# Patient Record
Sex: MALE | Race: WHITE | Employment: FULL TIME | URBAN - METROPOLITAN AREA
[De-identification: names, ages, dates, MRNs, and addresses within clinical notes are randomized per-mention and may not be internally consistent; named-entity substitution may affect disease eponyms.]

---

## 2019-08-21 ENCOUNTER — OFFICE VISIT (OUTPATIENT)
Dept: PRIMARY CARE CLINIC | Age: 64
End: 2019-08-21
Payer: COMMERCIAL

## 2019-08-21 VITALS
HEART RATE: 66 BPM | HEIGHT: 72 IN | WEIGHT: 183 LBS | SYSTOLIC BLOOD PRESSURE: 170 MMHG | BODY MASS INDEX: 24.79 KG/M2 | DIASTOLIC BLOOD PRESSURE: 94 MMHG

## 2019-08-21 DIAGNOSIS — E55.9 VITAMIN D DEFICIENCY: ICD-10-CM

## 2019-08-21 DIAGNOSIS — I10 SEVERE HYPERTENSION: Primary | ICD-10-CM

## 2019-08-21 DIAGNOSIS — E78.00 PURE HYPERCHOLESTEROLEMIA: ICD-10-CM

## 2019-08-21 LAB
BILIRUBIN, POC: NORMAL
BLOOD URINE, POC: NORMAL
CLARITY, POC: NORMAL
COLOR, POC: NORMAL
GLUCOSE URINE, POC: NORMAL
KETONES, POC: NORMAL
LEUKOCYTE EST, POC: NORMAL
NITRITE, POC: NORMAL
PH, POC: 7
PROTEIN, POC: NORMAL
SPECIFIC GRAVITY, POC: 1.02
UROBILINOGEN, POC: 0.2

## 2019-08-21 PROCEDURE — 99387 INIT PM E/M NEW PAT 65+ YRS: CPT | Performed by: FAMILY MEDICINE

## 2019-08-21 PROCEDURE — 81002 URINALYSIS NONAUTO W/O SCOPE: CPT | Performed by: FAMILY MEDICINE

## 2019-08-21 RX ORDER — M-VIT,TX,IRON,MINS/CALC/FOLIC 27MG-0.4MG
1 TABLET ORAL DAILY
COMMUNITY

## 2019-08-21 ASSESSMENT — PATIENT HEALTH QUESTIONNAIRE - PHQ9
2. FEELING DOWN, DEPRESSED OR HOPELESS: 0
SUM OF ALL RESPONSES TO PHQ9 QUESTIONS 1 & 2: 0
1. LITTLE INTEREST OR PLEASURE IN DOING THINGS: 0
SUM OF ALL RESPONSES TO PHQ QUESTIONS 1-9: 0
SUM OF ALL RESPONSES TO PHQ QUESTIONS 1-9: 0

## 2019-08-21 NOTE — PROGRESS NOTES
Subjective:      Patient ID: Nick Barragan is a 70 y.o. male. HPIHere to establish care. After running [employer] a doctor examined his skin and recommended a body survey, bright the shins. Review of Systems   HENT:        Occ blood clot ion the L nares   Eyes: Positive for visual disturbance. Genitourinary: Positive for difficulty urinating (very mild). All other systems reviewed and are negative. PMSHx reviewed and/or updated  BP 2 mos ago was ~ 130/79  Chol earlier this year was 124  Objective:   Physical Exam   Constitutional: He is oriented to person, place, and time. He appears well-developed and well-nourished. HENT:   Head: Normocephalic. Right Ear: Tympanic membrane, external ear and ear canal normal.   Left Ear: Tympanic membrane, external ear and ear canal normal.   Nose: Nose normal.   Mouth/Throat: Uvula is midline, oropharynx is clear and moist and mucous membranes are normal. No oropharyngeal exudate, posterior oropharyngeal edema or posterior oropharyngeal erythema. Eyes: Pupils are equal, round, and reactive to light. Conjunctivae and EOM are normal.   Cataract, os   Neck: Normal range of motion. Neck supple. Carotid bruit is not present. No thyromegaly present. Cardiovascular: Normal rate, regular rhythm and normal heart sounds. No murmur heard. Pulmonary/Chest: Effort normal and breath sounds normal.   Abdominal: Soft. Bowel sounds are normal. He exhibits no mass. There is no hepatosplenomegaly. There is no tenderness. No hernia. Hernia confirmed negative in the right inguinal area and confirmed negative in the left inguinal area. Genitourinary: Rectum normal, prostate normal, testes normal and penis normal.   Musculoskeletal: Normal range of motion. Joints stable and strong   Lymphadenopathy:     He has no cervical adenopathy. Neurological: He is alert and oriented to person, place, and time. He has normal strength and normal reflexes. No cranial nerve deficit.  He

## 2019-08-30 LAB
ANION GAP SERPL CALCULATED.3IONS-SCNC: 7 MMOL/L (ref 3–16)
BUN BLDV-MCNC: 14 MG/DL (ref 7–25)
CALCIUM SERPL-MCNC: 9.6 MG/DL (ref 8.6–10.3)
CHLORIDE BLD-SCNC: 105 MMOL/L (ref 98–110)
CHOLESTEROL, TOTAL: 149 MG/DL (ref 0–200)
CO2: 30 MMOL/L (ref 21–33)
CREAT SERPL-MCNC: 0.99 MG/DL (ref 0.6–1.3)
GFR, ESTIMATED: 80 SEE NOTE.
GFR, ESTIMATED: >90 SEE NOTE.
GLUCOSE BLD-MCNC: 94 MG/DL (ref 70–100)
HDLC SERPL-MCNC: 54 MG/DL (ref 60–92)
LDL CHOLESTEROL CALCULATED: 79 MG/DL
OSMOLALITY CALCULATION: 294 MOSM/KG (ref 278–305)
POTASSIUM SERPL-SCNC: 4.4 MMOL/L (ref 3.5–5.3)
SODIUM BLD-SCNC: 142 MMOL/L (ref 133–146)
TRIGL SERPL-MCNC: 79 MG/DL (ref 10–149)

## 2019-09-01 LAB — VITAMIN D 25-HYDROXY: 31.8 NG/ML (ref 30–100)

## 2019-10-31 ENCOUNTER — OFFICE VISIT (OUTPATIENT)
Dept: PRIMARY CARE CLINIC | Age: 64
End: 2019-10-31
Payer: MEDICARE

## 2019-10-31 VITALS
SYSTOLIC BLOOD PRESSURE: 150 MMHG | BODY MASS INDEX: 25.09 KG/M2 | DIASTOLIC BLOOD PRESSURE: 84 MMHG | HEART RATE: 65 BPM | WEIGHT: 185 LBS

## 2019-10-31 DIAGNOSIS — H26.9 CATARACT OF LEFT EYE, UNSPECIFIED CATARACT TYPE: ICD-10-CM

## 2019-10-31 DIAGNOSIS — I10 ESSENTIAL HYPERTENSION: ICD-10-CM

## 2019-10-31 DIAGNOSIS — Z01.818 PRE-OP EXAM: Primary | ICD-10-CM

## 2019-10-31 PROCEDURE — G8427 DOCREV CUR MEDS BY ELIG CLIN: HCPCS | Performed by: FAMILY MEDICINE

## 2019-10-31 PROCEDURE — G8484 FLU IMMUNIZE NO ADMIN: HCPCS | Performed by: FAMILY MEDICINE

## 2019-10-31 PROCEDURE — 99212 OFFICE O/P EST SF 10 MIN: CPT | Performed by: FAMILY MEDICINE

## 2019-10-31 PROCEDURE — G8417 CALC BMI ABV UP PARAM F/U: HCPCS | Performed by: FAMILY MEDICINE

## 2019-10-31 RX ORDER — VALSARTAN 160 MG/1
160 TABLET ORAL DAILY
Qty: 30 TABLET | Refills: 5 | Status: SHIPPED | OUTPATIENT
Start: 2019-10-31 | End: 2019-12-11

## 2019-10-31 ASSESSMENT — ENCOUNTER SYMPTOMS
GASTROINTESTINAL NEGATIVE: 1
RESPIRATORY NEGATIVE: 1

## 2019-12-11 ENCOUNTER — OFFICE VISIT (OUTPATIENT)
Dept: PRIMARY CARE CLINIC | Age: 64
End: 2019-12-11
Payer: MEDICARE

## 2019-12-11 VITALS — SYSTOLIC BLOOD PRESSURE: 150 MMHG | BODY MASS INDEX: 24.98 KG/M2 | DIASTOLIC BLOOD PRESSURE: 80 MMHG | WEIGHT: 184.2 LBS

## 2019-12-11 DIAGNOSIS — I10 ESSENTIAL HYPERTENSION: Primary | ICD-10-CM

## 2019-12-11 PROCEDURE — 3017F COLORECTAL CA SCREEN DOC REV: CPT | Performed by: FAMILY MEDICINE

## 2019-12-11 PROCEDURE — 4040F PNEUMOC VAC/ADMIN/RCVD: CPT | Performed by: FAMILY MEDICINE

## 2019-12-11 PROCEDURE — G8427 DOCREV CUR MEDS BY ELIG CLIN: HCPCS | Performed by: FAMILY MEDICINE

## 2019-12-11 PROCEDURE — 99213 OFFICE O/P EST LOW 20 MIN: CPT | Performed by: FAMILY MEDICINE

## 2019-12-11 PROCEDURE — G8420 CALC BMI NORM PARAMETERS: HCPCS | Performed by: FAMILY MEDICINE

## 2019-12-11 PROCEDURE — G8484 FLU IMMUNIZE NO ADMIN: HCPCS | Performed by: FAMILY MEDICINE

## 2019-12-11 PROCEDURE — 1123F ACP DISCUSS/DSCN MKR DOCD: CPT | Performed by: FAMILY MEDICINE

## 2019-12-11 PROCEDURE — 1036F TOBACCO NON-USER: CPT | Performed by: FAMILY MEDICINE

## 2019-12-11 RX ORDER — VALSARTAN AND HYDROCHLOROTHIAZIDE 160; 12.5 MG/1; MG/1
1 TABLET, FILM COATED ORAL DAILY
Qty: 30 TABLET | Refills: 5 | Status: SHIPPED | OUTPATIENT
Start: 2019-12-11 | End: 2020-06-16

## 2020-06-18 ENCOUNTER — VIRTUAL VISIT (OUTPATIENT)
Dept: PRIMARY CARE CLINIC | Age: 65
End: 2020-06-18
Payer: MEDICARE

## 2020-06-18 PROCEDURE — 3017F COLORECTAL CA SCREEN DOC REV: CPT | Performed by: FAMILY MEDICINE

## 2020-06-18 PROCEDURE — 4040F PNEUMOC VAC/ADMIN/RCVD: CPT | Performed by: FAMILY MEDICINE

## 2020-06-18 PROCEDURE — 1036F TOBACCO NON-USER: CPT | Performed by: FAMILY MEDICINE

## 2020-06-18 PROCEDURE — G8427 DOCREV CUR MEDS BY ELIG CLIN: HCPCS | Performed by: FAMILY MEDICINE

## 2020-06-18 PROCEDURE — 1123F ACP DISCUSS/DSCN MKR DOCD: CPT | Performed by: FAMILY MEDICINE

## 2020-06-18 PROCEDURE — G8420 CALC BMI NORM PARAMETERS: HCPCS | Performed by: FAMILY MEDICINE

## 2020-06-18 PROCEDURE — 99213 OFFICE O/P EST LOW 20 MIN: CPT | Performed by: FAMILY MEDICINE

## 2020-06-18 SDOH — ECONOMIC STABILITY: FOOD INSECURITY: WITHIN THE PAST 12 MONTHS, THE FOOD YOU BOUGHT JUST DIDN'T LAST AND YOU DIDN'T HAVE MONEY TO GET MORE.: NEVER TRUE

## 2020-06-18 SDOH — ECONOMIC STABILITY: INCOME INSECURITY: HOW HARD IS IT FOR YOU TO PAY FOR THE VERY BASICS LIKE FOOD, HOUSING, MEDICAL CARE, AND HEATING?: NOT HARD AT ALL

## 2020-06-18 SDOH — ECONOMIC STABILITY: FOOD INSECURITY: WITHIN THE PAST 12 MONTHS, YOU WORRIED THAT YOUR FOOD WOULD RUN OUT BEFORE YOU GOT MONEY TO BUY MORE.: NEVER TRUE

## 2020-06-18 ASSESSMENT — PATIENT HEALTH QUESTIONNAIRE - PHQ9
2. FEELING DOWN, DEPRESSED OR HOPELESS: 0
1. LITTLE INTEREST OR PLEASURE IN DOING THINGS: 0
SUM OF ALL RESPONSES TO PHQ QUESTIONS 1-9: 0
SUM OF ALL RESPONSES TO PHQ QUESTIONS 1-9: 0
SUM OF ALL RESPONSES TO PHQ9 QUESTIONS 1 & 2: 0

## 2020-06-18 ASSESSMENT — ENCOUNTER SYMPTOMS: RESPIRATORY NEGATIVE: 1

## 2020-06-18 NOTE — PROGRESS NOTES
Respiratory effort normal.  [x] No visualized signs of difficulty breathing or respiratory distress        [] Abnormal-      Musculoskeletal:   [] Normal gait with no signs of ataxia         [x] Normal range of motion of neck        [] Abnormal-       Neurological:        [x] No Facial Asymmetry (Cranial nerve 7 motor function) (limited exam to video visit)          [] No gaze palsy        [] Abnormal-         Skin:        [] No significant exanthematous lesions or discoloration noted on facial skin         [] Abnormal-            Psychiatric:       [x] Normal Affect [] No Hallucinations        [] Abnormal-     Other pertinent observable physical exam findings-     ASSESSMENT/PLAN:  Hypertension controlled according to the blood pressure checked at [facility]  Need for pneumococcal vaccine    Stay on current medications  Get a Pneumovax sometime soon    Nidhi Roe is a 75 y.o. male being evaluated by a Virtual Visit (video visit) encounter to address concerns as mentioned above. A caregiver was present when appropriate. Due to this being a TeleHealth encounter (During [facility]- public health emergency), evaluation of the following organ systems was limited: Vitals/Constitutional/EENT/Resp/CV/GI//MS/Neuro/Skin/Heme-Lymph-Imm. Pursuant to the emergency declaration under the [facility], [address] [place] [address] authority and the [employer] Act, this Virtual Visit was conducted with patient's (and/or legal guardian's) consent, to reduce the patient's risk of exposure to COVID-19 and provide necessary medical care. The patient (and/or legal guardian) has also been advised to contact this office for worsening conditions or problems, and seek emergency medical treatment and/or call 911 if deemed necessary.      Patient identification was verified at the start of the visit: Yes    Total time spent on this encounter: Not billed by

## 2020-09-15 NOTE — TELEPHONE ENCOUNTER
Medication:   Requested Prescriptions     Pending Prescriptions Disp Refills    valsartan-hydrochlorothiazide (DIOVAN-HCT) 160-12.5 MG per tablet [Pharmacy Med Name: [place] 160-12.5 MG TAB] 90 tablet 0     Sig: TAKE ONE TABLET BY MOUTH DAILY       Last appt: 6/18/2020   Next appt: Visit date not found    Last OARRS: No flowsheet data found.

## 2020-09-16 RX ORDER — VALSARTAN AND HYDROCHLOROTHIAZIDE 160; 12.5 MG/1; MG/1
TABLET, FILM COATED ORAL
Qty: 90 TABLET | Refills: 0 | Status: SHIPPED | OUTPATIENT
Start: 2020-09-16 | End: 2020-12-18 | Stop reason: SDUPTHER

## 2020-09-30 ENCOUNTER — OFFICE VISIT (OUTPATIENT)
Dept: PRIMARY CARE CLINIC | Age: 65
End: 2020-09-30
Payer: MEDICARE

## 2020-09-30 VITALS
DIASTOLIC BLOOD PRESSURE: 68 MMHG | SYSTOLIC BLOOD PRESSURE: 100 MMHG | BODY MASS INDEX: 24.82 KG/M2 | HEART RATE: 63 BPM | TEMPERATURE: 98 F | RESPIRATION RATE: 16 BRPM | OXYGEN SATURATION: 99 % | WEIGHT: 183 LBS

## 2020-09-30 PROBLEM — I10 ESSENTIAL HYPERTENSION: Chronic | Status: ACTIVE | Noted: 2020-09-30

## 2020-09-30 PROCEDURE — G8427 DOCREV CUR MEDS BY ELIG CLIN: HCPCS | Performed by: INTERNAL MEDICINE

## 2020-09-30 PROCEDURE — 99213 OFFICE O/P EST LOW 20 MIN: CPT | Performed by: INTERNAL MEDICINE

## 2020-09-30 PROCEDURE — 1123F ACP DISCUSS/DSCN MKR DOCD: CPT | Performed by: INTERNAL MEDICINE

## 2020-09-30 PROCEDURE — 3017F COLORECTAL CA SCREEN DOC REV: CPT | Performed by: INTERNAL MEDICINE

## 2020-09-30 PROCEDURE — 1036F TOBACCO NON-USER: CPT | Performed by: INTERNAL MEDICINE

## 2020-09-30 PROCEDURE — G8420 CALC BMI NORM PARAMETERS: HCPCS | Performed by: INTERNAL MEDICINE

## 2020-09-30 PROCEDURE — 4040F PNEUMOC VAC/ADMIN/RCVD: CPT | Performed by: INTERNAL MEDICINE

## 2020-09-30 RX ORDER — CEPHALEXIN 500 MG/1
500 CAPSULE ORAL 4 TIMES DAILY
Qty: 28 CAPSULE | Refills: 0 | Status: SHIPPED | OUTPATIENT
Start: 2020-09-30 | End: 2020-11-06 | Stop reason: SDUPTHER

## 2020-09-30 NOTE — PROGRESS NOTES
Subjective:      Patient ID: Savi Beltran is a 75 y.o. male. HPI  Had a cut injury 2 weeks ago, with open wound, when he bumped in to a rusted can   Has some redness and heat around it in the last 3-4 days,  No fever or chills,  Some pain locally, and some pain walking,   Had tetanus shot 3 years ago,   Essential hypertension  This is a chronic problem. The problem is well controlled. Patient monitors readings regularly. Pertinent negatives include no chest pain, focal sensory loss, focal weakness, leg pain, myalgias or shortness of breath. No headaches or chest pain. Takes medications regularly. Blood pressure has been stable, blood work was reviewed, and advised patient to continue the current instructions or medications. Current Outpatient Medications   Medication Sig Dispense Refill    valsartan-hydrochlorothiazide (DIOVAN-HCT) 160-12.5 MG per tablet TAKE ONE TABLET BY MOUTH DAILY 90 tablet 0    Multiple Vitamins-Minerals (THERAPEUTIC MULTIVITAMIN-MINERALS) tablet Take 1 tablet by mouth daily       No current facility-administered medications for this visit. No Known Allergies   Review of Systems  All the review of systems negative except above   Objective:   Physical Exam  /68 (Site: Left Upper Arm, Position: Sitting, Cuff Size: Medium Adult)   Pulse 63   Temp 98 °F (36.7 °C) (Skin)   Resp 16   Wt 183 lb (83 kg)   SpO2 99%   BMI 24.82 kg/m²    The physical exam reveals a patient who appears well, alert and oriented x 3, pleasant, cooperative. Vitals are as noted. Head is atraumatic and normocephalic. Eyes reveal normal conjunctiva, cornea normal, pupils are equal and rective to light. Nasal mucosa is normal. Throat is normal without exudates. Ears reveal normal tympanic membranes. Neck is supple and free of adenopathy, or masses. No thyromegaly. No jugular venous distension. Lungs are clear to auscultation, no rales or rhonchi noted.  Heart sounds are regular , no murmurs, clicks, gallops or rubs. Abdomen is soft, no tenderness, masses or organomegaly. Bowel sounds are normally heard. Pelvis: normal. Extremities are normal. Peripheral pulses are normal. Screening neurological exam is normal without focal findings. Cranial nerves are intact, reflexes are symmetrical and muscle strength eaqual. Skin is normal without suspicious lesions noted. left leg- has a small entry wound w some surroundinf=g redness and warmth. Assessment:      Infected wound  cellulitis      Plan:      Start keflex 500 mg po tid for 7 days.          Rm Fuentes MD

## 2020-11-06 ENCOUNTER — TELEPHONE (OUTPATIENT)
Dept: PRIMARY CARE CLINIC | Age: 65
End: 2020-11-06

## 2020-11-06 RX ORDER — CEPHALEXIN 500 MG/1
500 CAPSULE ORAL 4 TIMES DAILY
Qty: 40 CAPSULE | Refills: 0 | Status: SHIPPED | OUTPATIENT
Start: 2020-11-06 | End: 2020-11-16

## 2020-12-18 ENCOUNTER — TELEPHONE (OUTPATIENT)
Dept: PRIMARY CARE CLINIC | Age: 65
End: 2020-12-18

## 2020-12-18 RX ORDER — VALSARTAN AND HYDROCHLOROTHIAZIDE 160; 12.5 MG/1; MG/1
1 TABLET, FILM COATED ORAL DAILY
Qty: 90 TABLET | Refills: 0 | Status: SHIPPED | OUTPATIENT
Start: 2020-12-18 | End: 2021-03-16

## 2020-12-18 RX ORDER — VALSARTAN AND HYDROCHLOROTHIAZIDE 160; 12.5 MG/1; MG/1
TABLET, FILM COATED ORAL
Qty: 90 TABLET | Refills: 0 | Status: CANCELLED | OUTPATIENT
Start: 2020-12-18

## 2020-12-18 NOTE — TELEPHONE ENCOUNTER
Medication:   Requested Prescriptions     Pending Prescriptions Disp Refills    valsartan-hydrochlorothiazide (DIOVAN-HCT) 160-12.5 MG per tablet 90 tablet 0     Last Filled:  9.16.20  Last appt: 9/30/2020   Next appt: Visit date not found    Last OARRS: No flowsheet data found.

## 2021-03-16 RX ORDER — VALSARTAN AND HYDROCHLOROTHIAZIDE 160; 12.5 MG/1; MG/1
TABLET, FILM COATED ORAL
Qty: 90 TABLET | Refills: 0 | Status: SHIPPED | OUTPATIENT
Start: 2021-03-16 | End: 2021-06-10

## 2021-04-14 ENCOUNTER — TELEPHONE (OUTPATIENT)
Dept: PRIMARY CARE CLINIC | Age: 66
End: 2021-04-14

## 2021-06-09 NOTE — TELEPHONE ENCOUNTER
Medication:   Requested Prescriptions     Pending Prescriptions Disp Refills    valsartan-hydroCHLOROthiazide (DIOVAN-HCT) 160-12.5 MG per tablet [Pharmacy Med Name: Delia Paz 160-12.5 MG TAB] 90 tablet 0     Sig: TAKE ONE TABLET BY MOUTH DAILY     Last Filled:  3.16.21  Last appt: 9/30/2020   Next appt: 6/18/2021    Last OARRS: No flowsheet data found.

## 2021-06-10 RX ORDER — VALSARTAN AND HYDROCHLOROTHIAZIDE 160; 12.5 MG/1; MG/1
TABLET, FILM COATED ORAL
Qty: 90 TABLET | Refills: 0 | Status: SHIPPED | OUTPATIENT
Start: 2021-06-10 | End: 2021-09-15 | Stop reason: SDUPTHER

## 2021-06-18 ENCOUNTER — OFFICE VISIT (OUTPATIENT)
Dept: PRIMARY CARE CLINIC | Age: 66
End: 2021-06-18
Payer: MEDICARE

## 2021-06-18 VITALS
TEMPERATURE: 97.5 F | OXYGEN SATURATION: 98 % | HEIGHT: 72 IN | BODY MASS INDEX: 26.33 KG/M2 | DIASTOLIC BLOOD PRESSURE: 65 MMHG | HEART RATE: 75 BPM | SYSTOLIC BLOOD PRESSURE: 110 MMHG | WEIGHT: 194.4 LBS

## 2021-06-18 DIAGNOSIS — I10 ESSENTIAL HYPERTENSION: Primary | ICD-10-CM

## 2021-06-18 DIAGNOSIS — B35.1 ONYCHOMYCOSIS: ICD-10-CM

## 2021-06-18 LAB
ALBUMIN SERPL-MCNC: 4.1 G/DL (ref 3.4–5)
ALP BLD-CCNC: 87 U/L (ref 40–129)
ALT SERPL-CCNC: 15 U/L (ref 10–40)
AST SERPL-CCNC: 24 U/L (ref 15–37)
BILIRUB SERPL-MCNC: 1 MG/DL (ref 0–1)
BILIRUBIN DIRECT: <0.2 MG/DL (ref 0–0.3)
BILIRUBIN, INDIRECT: NORMAL MG/DL (ref 0–1)
TOTAL PROTEIN: 6.9 G/DL (ref 6.4–8.2)

## 2021-06-18 PROCEDURE — 3017F COLORECTAL CA SCREEN DOC REV: CPT | Performed by: FAMILY MEDICINE

## 2021-06-18 PROCEDURE — G8417 CALC BMI ABV UP PARAM F/U: HCPCS | Performed by: FAMILY MEDICINE

## 2021-06-18 PROCEDURE — 1036F TOBACCO NON-USER: CPT | Performed by: FAMILY MEDICINE

## 2021-06-18 PROCEDURE — 4040F PNEUMOC VAC/ADMIN/RCVD: CPT | Performed by: FAMILY MEDICINE

## 2021-06-18 PROCEDURE — G8427 DOCREV CUR MEDS BY ELIG CLIN: HCPCS | Performed by: FAMILY MEDICINE

## 2021-06-18 PROCEDURE — 99214 OFFICE O/P EST MOD 30 MIN: CPT | Performed by: FAMILY MEDICINE

## 2021-06-18 PROCEDURE — 1123F ACP DISCUSS/DSCN MKR DOCD: CPT | Performed by: FAMILY MEDICINE

## 2021-06-18 RX ORDER — TERBINAFINE HYDROCHLORIDE 250 MG/1
250 TABLET ORAL DAILY
Qty: 45 TABLET | Refills: 1 | Status: SHIPPED | OUTPATIENT
Start: 2021-06-18 | End: 2021-08-02

## 2021-06-18 ASSESSMENT — PATIENT HEALTH QUESTIONNAIRE - PHQ9
1. LITTLE INTEREST OR PLEASURE IN DOING THINGS: 0
SUM OF ALL RESPONSES TO PHQ QUESTIONS 1-9: 0
SUM OF ALL RESPONSES TO PHQ9 QUESTIONS 1 & 2: 0
2. FEELING DOWN, DEPRESSED OR HOPELESS: 0
SUM OF ALL RESPONSES TO PHQ QUESTIONS 1-9: 0
SUM OF ALL RESPONSES TO PHQ QUESTIONS 1-9: 0

## 2021-06-18 NOTE — PROGRESS NOTES
Xander Jorgensen (:  1955) is a 77 y.o. male,Established patient, here for evaluation of the following chief complaint(s): Annual Exam         ASSESSMENT/PLAN:  Well man  Onychomycosis  Blood pressure controlled    Refill BP meds  Check liver function and if okay start terbinafine 250 mg daily for 45 days then check liver function again         Subjective   SUBJECTIVE/OBJECTIVE:  HPITolerating the valsartan    Review of Systems   All other systems reviewed and are negative. Objective   Physical Exam  Constitutional:       Appearance: He is well-developed. HENT:      Head: Normocephalic. Right Ear: Tympanic membrane, ear canal and external ear normal.      Left Ear: Tympanic membrane, ear canal and external ear normal.      Nose: Nose normal.      Mouth/Throat:      Pharynx: Uvula midline. No oropharyngeal exudate or posterior oropharyngeal erythema. Eyes:      Conjunctiva/sclera: Conjunctivae normal.      Pupils: Pupils are equal, round, and reactive to light. Neck:      Thyroid: No thyromegaly. Vascular: No carotid bruit. Cardiovascular:      Rate and Rhythm: Normal rate and regular rhythm. Heart sounds: Normal heart sounds. No murmur heard. Pulmonary:      Effort: Pulmonary effort is normal.      Breath sounds: Normal breath sounds. Abdominal:      General: Bowel sounds are normal.      Palpations: Abdomen is soft. There is no mass. Tenderness: There is no abdominal tenderness. Hernia: No hernia is present. There is no hernia in the left inguinal area. Genitourinary:     Penis: Normal.       Testes: Normal.   Musculoskeletal:         General: Normal range of motion. Cervical back: Normal range of motion and neck supple. Comments: Joints stable and strong   Lymphadenopathy:      Cervical: No cervical adenopathy. Skin:     General: Skin is warm and dry. Findings: No rash.       Comments: Extensive onychomycosis of all of his toes Neurological:      Mental Status: He is alert and oriented to person, place, and time. Cranial Nerves: No cranial nerve deficit. Deep Tendon Reflexes: Reflexes are normal and symmetric. Psychiatric:         Behavior: Behavior normal.         Vitals:    06/18/21 1534   BP: 110/65   Site: Right Upper Arm   Position: Sitting   Cuff Size: Medium Adult   Pulse: 75   Temp: 97.5 °F (36.4 °C)   TempSrc: Oral   SpO2: 98%   Weight: 194 lb 6.4 oz (88.2 kg)   Height: 6' (1.829 m)         An electronic signature was used to authenticate this note.     --Orlando Christiansen MD

## 2021-09-09 RX ORDER — VALSARTAN AND HYDROCHLOROTHIAZIDE 160; 12.5 MG/1; MG/1
TABLET, FILM COATED ORAL
Qty: 90 TABLET | Refills: 0 | OUTPATIENT
Start: 2021-09-09

## 2021-09-09 RX ORDER — TERBINAFINE HYDROCHLORIDE 250 MG/1
TABLET ORAL
Qty: 45 TABLET | Refills: 1 | OUTPATIENT
Start: 2021-09-09

## 2021-09-09 NOTE — TELEPHONE ENCOUNTER
Medication:   Requested Prescriptions     Pending Prescriptions Disp Refills    valsartan-hydroCHLOROthiazide (DIOVAN-HCT) 160-12.5 MG per tablet [Pharmacy Med Name: Moraima Corona 160-12.5 MG TAB] 90 tablet 0     Sig: TAKE ONE TABLET BY MOUTH DAILY    terbinafine (LAMISIL) 250 MG tablet [Pharmacy Med Name: TERBINAFINE  MG TABLET] 45 tablet 1     Sig: TAKE ONE TABLET BY MOUTH DAILY     Last Filled: 6/2021  Last appt: 6/18/2021   Next appt: Visit date not found  Left message for return call. Does pt have new PCP? Last OARRS: No flowsheet data found.

## 2021-09-09 NOTE — TELEPHONE ENCOUNTER
Patient is in the process of deciding on who he wants to go to. Was a Doctor Matilde Gonsales patient.  He is getting ready to go on vacation and needs a refill on the following medication       valsartan-hydroCHLOROthiazide (DIOVAN-HCT) 160-12.5 MG per tablet [[id number]]     Order Details  Dose, Route, Frequency: As Directed   Dispense Quantity: 90 tablet Refills: 0          Sig: TAKE ONE TABLET BY MOUTH DAILY           Pharmacy    [address] [place] [address]   Phone:  [phone]  Fax:  [phone]     LOV 06/18/2021    Thank you

## 2021-09-14 NOTE — TELEPHONE ENCOUNTER
Patient needs his  valsartan-hydroCHLOROthiazide (DIOVAN-HCT) 160-12.5 MG per tablet   To be refilled soon as he's going on vacation this Saturday.     [place] [id number] [facility] [address] [facility]

## 2021-09-15 ENCOUNTER — TELEPHONE (OUTPATIENT)
Dept: PRIMARY CARE CLINIC | Age: 66
End: 2021-09-15

## 2021-09-15 RX ORDER — VALSARTAN AND HYDROCHLOROTHIAZIDE 160; 12.5 MG/1; MG/1
TABLET, FILM COATED ORAL
Qty: 90 TABLET | Refills: 0 | Status: SHIPPED | OUTPATIENT
Start: 2021-09-15 | End: 2021-12-10

## 2021-09-15 RX ORDER — VALSARTAN AND HYDROCHLOROTHIAZIDE 160; 12.5 MG/1; MG/1
TABLET, FILM COATED ORAL
Qty: 90 TABLET | Refills: 0 | Status: CANCELLED | OUTPATIENT
Start: 2021-09-15

## 2021-09-15 NOTE — TELEPHONE ENCOUNTER
----- Message from Benjamin Anderson sent at 9/14/2021  6:16 PM EDT -----  Subject: Message to Provider    QUESTIONS  Information for Provider? Patient called again last night hoping to get   his refill by friday when he goes out of town, has only 2 days left of   meds at this point. His PCP was Savanah who retired so he does need to   choose a PCP in your office. Whomever is taking those patients is fine   with the patient, Marlon Antony is the provider helping with this so when   pt returns from trip, he will establish with that PCP. Please call him if   more questions at [phone].  ---------------------------------------------------------------------------  --------------  CALL BACK INFO  What is the best way for the office to contact you? OK to leave message on   voicemail  Preferred Call Back Phone Number? [phone]  ---------------------------------------------------------------------------  --------------  SCRIPT ANSWERS  Relationship to Patient?  Self

## 2021-09-15 NOTE — TELEPHONE ENCOUNTER
Pt requesting refill for the following:    Medication:    valsartan-hydroCHLOROthiazide (DIOVAN-HCT) 160-12.5 MG per tablet [[id number]]     Patient was leaving town on Saturday, he scheduled NTP appt with Dr Tona Robledo on 9/29/21 when he returns    Pharmacy Contact:    [address], [address] [place] [address]   Phone:  [phone]  Fax:  [phone]

## 2021-09-29 ENCOUNTER — OFFICE VISIT (OUTPATIENT)
Dept: PRIMARY CARE CLINIC | Age: 66
End: 2021-09-29
Payer: COMMERCIAL

## 2021-09-29 VITALS
WEIGHT: 190.4 LBS | TEMPERATURE: 98.4 F | OXYGEN SATURATION: 96 % | DIASTOLIC BLOOD PRESSURE: 68 MMHG | BODY MASS INDEX: 25.82 KG/M2 | HEART RATE: 78 BPM | SYSTOLIC BLOOD PRESSURE: 124 MMHG

## 2021-09-29 DIAGNOSIS — I10 ESSENTIAL HYPERTENSION: Chronic | ICD-10-CM

## 2021-09-29 DIAGNOSIS — Z11.59 NEED FOR HEPATITIS C SCREENING TEST: ICD-10-CM

## 2021-09-29 DIAGNOSIS — Z23 NEED FOR INFLUENZA VACCINATION: ICD-10-CM

## 2021-09-29 DIAGNOSIS — C61 MALIGNANT NEOPLASM OF PROSTATE (HCC): ICD-10-CM

## 2021-09-29 DIAGNOSIS — Z00.00 ENCOUNTER FOR ANNUAL PHYSICAL EXAM: Primary | ICD-10-CM

## 2021-09-29 PROCEDURE — 90471 IMMUNIZATION ADMIN: CPT | Performed by: STUDENT IN AN ORGANIZED HEALTH CARE EDUCATION/TRAINING PROGRAM

## 2021-09-29 PROCEDURE — 99397 PER PM REEVAL EST PAT 65+ YR: CPT | Performed by: STUDENT IN AN ORGANIZED HEALTH CARE EDUCATION/TRAINING PROGRAM

## 2021-09-29 PROCEDURE — 90694 VACC AIIV4 NO PRSRV 0.5ML IM: CPT | Performed by: STUDENT IN AN ORGANIZED HEALTH CARE EDUCATION/TRAINING PROGRAM

## 2021-09-29 SDOH — ECONOMIC STABILITY: FOOD INSECURITY: WITHIN THE PAST 12 MONTHS, YOU WORRIED THAT YOUR FOOD WOULD RUN OUT BEFORE YOU GOT MONEY TO BUY MORE.: NEVER TRUE

## 2021-09-29 SDOH — ECONOMIC STABILITY: FOOD INSECURITY: WITHIN THE PAST 12 MONTHS, THE FOOD YOU BOUGHT JUST DIDN'T LAST AND YOU DIDN'T HAVE MONEY TO GET MORE.: NEVER TRUE

## 2021-09-29 ASSESSMENT — ENCOUNTER SYMPTOMS
SHORTNESS OF BREATH: 0
DIARRHEA: 0
VOMITING: 0
RHINORRHEA: 0
COUGH: 0
SORE THROAT: 0
CONSTIPATION: 0
NAUSEA: 0

## 2021-09-29 ASSESSMENT — SOCIAL DETERMINANTS OF HEALTH (SDOH): HOW HARD IS IT FOR YOU TO PAY FOR THE VERY BASICS LIKE FOOD, HOUSING, MEDICAL CARE, AND HEATING?: NOT HARD AT ALL

## 2021-09-29 NOTE — PROGRESS NOTES
2021    Velia Jose (:  1955) is a 77 y.o. male, here for a preventive medicine evaluation. Patient Active Problem List   Diagnosis    Essential hypertension    Colon polyps    Hypertrophy of prostate with urinary obstruction and other lower urinary tract symptoms (LUTS)    Malignant ne[facility] of [facility] ([facility] [address]    Nuclear senile cataract     HYPERTENSION  Stable on meds    Long distance runner    History of prostate cancer and has PSA yearly with urology    Review of Systems   Constitutional: Negative for chills and fever. HENT: Negative for congestion, rhinorrhea and sore throat. Eyes: Negative for visual disturbance. Respiratory: Negative for cough and shortness of breath. Cardiovascular: Negative for chest pain. Gastrointestinal: Negative for constipation, diarrhea, nausea and vomiting. Endocrine: Negative for polydipsia and polyuria. Genitourinary: Negative for dysuria. Musculoskeletal: Negative for arthralgias. Skin: Negative for rash. Allergic/Immunologic: Negative for environmental allergies. Neurological: Negative for headaches. Psychiatric/Behavioral: The patient is not nervous/anxious. Prior to Visit Medications    Medication Sig Taking?  Authorizing Provider   valsartan-hydroCHLOROthiazide (DIOVAN-HCT) 160-12.5 MG per tablet TAKE ONE TABLET BY MOUTH DAILY Yes Shane Ortega MD   Multiple Vitamins-Minerals (THERAPEUTIC MULTIVITAMIN-MINERALS) tablet Take 1 tablet by mouth daily Yes Historical Provider, MD        No Known Allergies    Past Medical History:   Diagnosis Date    Prostate cancer ([facility] [address] 2013    brachytherapy       Past Surgical History:   Procedure Laterality Date    CATARACT REMOVAL WITH IMPLANT Right     INGUINAL HERNIA REPAIR Left     injury to seminal vesicles, rendered him infertile       Social History     Socioeconomic History    Marital status:      Spouse name: Not on file    Number of children: 3    COVID Pre-Visit Screening     1  Is this a family member screening? No  2  Have you traveled outside of your state in the past 2 weeks? No  3  Do you presently have a fever or flu-like symptoms? No  4  Do you have symptoms of an upper respiratory infection like runny nose, sore throat, or cough? No  5  Are you suffering from new headache that you have not had in the past?  No  6  Do you have/have you experienced any new shortness of breath recently? No  7  Do you have any new diarrhea, nausea or vomiting? No  8  Have you been in contact with anyone who has been sick or diagnosed with COVID-19? No  9  Do you have any new loss of taste or smell? No  10  Are you able to wear a mask without a valve for the entire visit?  Yes Years of education: Not on file    Highest education level: Not on file   Occupational History    Occupation: data,    Tobacco Use    Smoking status: Never Smoker    Smokeless tobacco: Never Used   Substance and Sexual Activity    Alcohol use: Not Currently    Drug use: Never    Sexual activity: Not on file   Other Topics Concern    Not on file   Social History Narrative    Runs 25 mi/week     Social Determinants of Health     Financial Resource Strain: Low Risk     Difficulty of Paying Living Expenses: Not hard at all   Food Insecurity: No Food Insecurity    Worried About [address] in the Last Year: Never true    [facility] [address] in the Last Year: Never true   Transportation Needs:     Lack of Transportation (Medical):  Lack of Transportation (Non-Medical):    Physical Activity:     Days of Exercise per Week:     Minutes of Exercise per Session:    Stress:     Feeling of Stress :    Social Connections:     Frequency of Communication with Friends and Family:     Frequency of Social Gatherings with Friends and Family:     Attends Yazidism Services:     Active Member of Clubs or Organizations:     Attends Club or Organization Meetings:     Marital Status:    Intimate Partner Violence:     Fear of Current or Ex-Partner:     Emotionally Abused:     Physically Abused:     Sexually Abused:         No family history on file. ADVANCE DIRECTIVE: N, <no information>    Vitals:    09/29/21 1631   BP: 124/68   Site: Right Upper Arm   Position: Sitting   Cuff Size: Medium Adult   Pulse: 78   Temp: 98.4 °F (36.9 °C)   TempSrc: Oral   SpO2: 96%   Weight: 190 lb 6.4 oz (86.4 kg)     Estimated body mass index is 25.82 kg/m² as calculated from the following:    Height as of 6/18/21: 6' (1.829 m). Weight as of this encounter: 190 lb 6.4 oz (86.4 kg). Physical Exam  Vitals reviewed. Constitutional:       General: He is not in acute distress.      Appearance: Normal appearance. He is not ill-appearing. HENT:      Head: Normocephalic and atraumatic. Right Ear: Tympanic membrane, ear canal and external ear normal.      Left Ear: Tympanic membrane, ear canal and external ear normal.      Nose: Nose normal. No rhinorrhea. Mouth/Throat:      Mouth: Mucous membranes are moist.      Pharynx: Oropharynx is clear. No oropharyngeal exudate. Eyes:      General: No scleral icterus. Right eye: No discharge. Left eye: No discharge. Extraocular Movements: Extraocular movements intact. Conjunctiva/sclera: Conjunctivae normal.   Cardiovascular:      Rate and Rhythm: Normal rate and regular rhythm. Pulses: Normal pulses. Heart sounds: Normal heart sounds. No murmur heard. No gallop. Pulmonary:      Effort: Pulmonary effort is normal.      Breath sounds: Normal breath sounds. No wheezing, rhonchi or rales. Abdominal:      General: Abdomen is flat. Bowel sounds are normal. There is no distension. Palpations: Abdomen is soft. There is no mass. Musculoskeletal:         General: Normal range of motion. Cervical back: Neck supple. No muscular tenderness. Lymphadenopathy:      Cervical: No cervical adenopathy. Skin:     General: Skin is warm. Capillary Refill: Capillary refill takes less than 2 seconds. Findings: No rash. Neurological:      General: No focal deficit present. Mental Status: He is alert. Cranial Nerves: No cranial nerve deficit. Psychiatric:         Mood and Affect: Mood normal.         Behavior: Behavior normal.         No flowsheet data found.     Lab Results   Component Value Date    CHOL 149 08/30/2019    TRIG 79 08/30/2019    HDL 54 08/30/2019    LDLCALC 79 08/30/2019    GLUCOSE 94 08/30/2019       The 10-year ASCVD risk score (Kristin Noriega, et al., 2013) is: 13%    Values used to calculate the score:      Age: 77 years      Sex: Male      Is Non- : No      Diabetic: No      Tobacco smoker: No      Systolic Blood Pressure: 690 mmHg      Is BP treated: Yes      HDL Cholesterol: 54 mg/dL      Total Cholesterol: 149 mg/dL    Immunization History   Administered Date(s) Administered    Influenza, High Dose (Fluzone 65 yrs and older) 12/30/2019, 09/28/2020    Influenza, High-dose, Quadv, 65 yrs +, IM (Fluzone) 09/27/2020    Influenza, Quadv, adjuvanted, 65 yrs +, IM, PF (Fluad) 09/29/2021    Pneumococcal Polysaccharide ([id number]) 12/30/2020       Health Maintenance   Topic Date Due    Hepatitis C screen  Never done    COVID-19 Vaccine (1) Never done    DTaP/Tdap/Td vaccine (1 - Tdap) Never done    Lipid screen  Never done    Shingles Vaccine (1 of 2) Never done    PSA counseling  Never done    Potassium monitoring  08/30/2020    Creatinine monitoring  08/30/2020    Colon cancer screen colonoscopy  07/28/2021    Flu vaccine  Completed    Pneumococcal 65+ years Vaccine  Completed    Hepatitis A vaccine  Aged Out    Hepatitis B vaccine  Aged Out    Hib vaccine  Aged Out    Meningococcal (ACWY) vaccine  Aged Out          ASSESSMENT/PLAN:  1. Encounter for annual physical exam  -     BASIC METABOLIC PANEL; Future  -     MICROSCOPIC URINALYSIS; Future  -     Lipid Panel; Future  2. Need for hepatitis C screening test  -     HEPATITIS C ANTIBODY; Future  3. Need for influenza vaccination  -     INFLUENZA, QUADV, ADJUVANTED, 65 YRS =, IM, PF, PREFILL SYR, 0.5ML (FLUAD)  4. Malignant neoplasm of prostate [facility])  Assessment & Plan:   Resolved, sees urology and gets yearly PSA  5. Essential hypertension  Assessment & Plan:   Chronic well controlled  Cont diovan-hct 160 mg -12.5 mg daily      Return in about 6 months (around 3/29/2022). An electronic signature was used to authenticate this note.     --Bayron No MD on 10/17/2021 at 11:11 AM

## 2021-10-21 ENCOUNTER — TELEPHONE (OUTPATIENT)
Dept: PRIMARY CARE CLINIC | Age: 66
End: 2021-10-21

## 2021-10-21 NOTE — TELEPHONE ENCOUNTER
MELISSA   Pt is positive for  Covid 19  and wanted to let the doctor know he stated that his symptoms are very mangable right now,he said that his symptoms are like a mild cold right now,pt was informed to call back if his symptoms become unmanageable

## 2021-11-13 DIAGNOSIS — Z00.00 ENCOUNTER FOR ANNUAL PHYSICAL EXAM: ICD-10-CM

## 2021-11-13 DIAGNOSIS — Z11.59 NEED FOR HEPATITIS C SCREENING TEST: ICD-10-CM

## 2021-11-13 LAB
ANION GAP SERPL CALCULATED.3IONS-SCNC: 14 MMOL/L (ref 3–16)
BUN BLDV-MCNC: 17 MG/DL (ref 7–20)
CALCIUM SERPL-MCNC: 9.9 MG/DL (ref 8.3–10.6)
CHLORIDE BLD-SCNC: 97 MMOL/L (ref 99–110)
CHOLESTEROL, TOTAL: 161 MG/DL (ref 0–199)
CO2: 26 MMOL/L (ref 21–32)
CREAT SERPL-MCNC: 0.9 MG/DL (ref 0.8–1.3)
EPITHELIAL CELLS, UA: 0 /HPF (ref 0–5)
GFR AFRICAN AMERICAN: >60
GFR NON-AFRICAN AMERICAN: >60
GLUCOSE BLD-MCNC: 86 MG/DL (ref 70–99)
HDLC SERPL-MCNC: 49 MG/DL (ref 40–60)
HEPATITIS C ANTIBODY INTERPRETATION: NORMAL
HYALINE CASTS: 0 /LPF (ref 0–8)
LDL CHOLESTEROL CALCULATED: 92 MG/DL
POTASSIUM SERPL-SCNC: 4.5 MMOL/L (ref 3.5–5.1)
RBC UA: 0 /HPF (ref 0–4)
SODIUM BLD-SCNC: 137 MMOL/L (ref 136–145)
TRIGL SERPL-MCNC: 100 MG/DL (ref 0–150)
URINE TYPE: NORMAL
VLDLC SERPL CALC-MCNC: 20 MG/DL
WBC UA: 0 /HPF (ref 0–5)

## 2021-12-10 RX ORDER — VALSARTAN AND HYDROCHLOROTHIAZIDE 160; 12.5 MG/1; MG/1
TABLET, FILM COATED ORAL
Qty: 90 TABLET | Refills: 0 | Status: SHIPPED | OUTPATIENT
Start: 2021-12-10 | End: 2021-12-13

## 2021-12-10 NOTE — TELEPHONE ENCOUNTER
Medication:   Requested Prescriptions     Pending Prescriptions Disp Refills    valsartan-hydroCHLOROthiazide (DIOVAN-HCT) 160-12.5 MG per tablet [Pharmacy Med Name: Oniel Thomason 160-12.5 MG TAB] 90 tablet 0     Sig: TAKE ONE TABLET BY MOUTH DAILY     Last Filled:  9.15.21    Last appt: 9/29/2021   Next appt: Visit date not found    Last OARRS: No flowsheet data found.

## 2021-12-13 RX ORDER — VALSARTAN AND HYDROCHLOROTHIAZIDE 160; 12.5 MG/1; MG/1
TABLET, FILM COATED ORAL
Qty: 90 TABLET | Refills: 0 | Status: SHIPPED | OUTPATIENT
Start: 2021-12-13 | End: 2022-06-06

## 2021-12-13 NOTE — TELEPHONE ENCOUNTER
Medication:   Requested Prescriptions     Pending Prescriptions Disp Refills    valsartan-hydroCHLOROthiazide (DIOVAN-HCT) 160-12.5 MG per tablet [Pharmacy Med Name: Stefania Rao 160-12.5 MG TAB] 90 tablet 0     Sig: TAKE ONE TABLET BY MOUTH DAILY       Last appt: 9/29/2021   Next appt: Visit date not found    Last OARRS: No flowsheet data found.

## 2022-06-06 RX ORDER — VALSARTAN AND HYDROCHLOROTHIAZIDE 160; 12.5 MG/1; MG/1
TABLET, FILM COATED ORAL
Qty: 90 TABLET | Refills: 0 | Status: SHIPPED | OUTPATIENT
Start: 2022-06-06 | End: 2022-09-07 | Stop reason: SDUPTHER

## 2022-06-06 NOTE — TELEPHONE ENCOUNTER
Medication:   Requested Prescriptions     Pending Prescriptions Disp Refills    valsartan-hydroCHLOROthiazide (DIOVAN-HCT) 160-12.5 MG per tablet [Pharmacy Med Name: Collette Ginger 160-12.5 MG TAB] 90 tablet 0     Sig: TAKE ONE TABLET BY MOUTH DAILY     Last Filled:  12.13.21    Last appt: 9/29/2021   Next appt: Visit date not found    Last OARRS: No flowsheet data found.

## 2022-09-07 ENCOUNTER — OFFICE VISIT (OUTPATIENT)
Dept: PRIMARY CARE CLINIC | Age: 67
End: 2022-09-07
Payer: COMMERCIAL

## 2022-09-07 VITALS
OXYGEN SATURATION: 95 % | RESPIRATION RATE: 18 BRPM | DIASTOLIC BLOOD PRESSURE: 78 MMHG | BODY MASS INDEX: 26.3 KG/M2 | WEIGHT: 194.2 LBS | TEMPERATURE: 97.4 F | SYSTOLIC BLOOD PRESSURE: 130 MMHG | HEIGHT: 72 IN | HEART RATE: 71 BPM

## 2022-09-07 DIAGNOSIS — I10 ESSENTIAL HYPERTENSION: Chronic | ICD-10-CM

## 2022-09-07 DIAGNOSIS — L57.0 ACTINIC KERATOSES: ICD-10-CM

## 2022-09-07 DIAGNOSIS — Z00.00 ENCOUNTER FOR ANNUAL PHYSICAL EXAM: Primary | ICD-10-CM

## 2022-09-07 DIAGNOSIS — C61 MALIGNANT NEOPLASM OF PROSTATE (HCC): ICD-10-CM

## 2022-09-07 PROCEDURE — 99397 PER PM REEVAL EST PAT 65+ YR: CPT | Performed by: STUDENT IN AN ORGANIZED HEALTH CARE EDUCATION/TRAINING PROGRAM

## 2022-09-07 RX ORDER — VALSARTAN AND HYDROCHLOROTHIAZIDE 160; 12.5 MG/1; MG/1
1 TABLET, FILM COATED ORAL DAILY
Qty: 90 TABLET | Refills: 1 | Status: SHIPPED | OUTPATIENT
Start: 2022-09-07 | End: 2022-09-12

## 2022-09-07 ASSESSMENT — PATIENT HEALTH QUESTIONNAIRE - PHQ9
SUM OF ALL RESPONSES TO PHQ QUESTIONS 1-9: 0
1. LITTLE INTEREST OR PLEASURE IN DOING THINGS: 0
2. FEELING DOWN, DEPRESSED OR HOPELESS: 0
SUM OF ALL RESPONSES TO PHQ9 QUESTIONS 1 & 2: 0
SUM OF ALL RESPONSES TO PHQ QUESTIONS 1-9: 0

## 2022-09-07 NOTE — PROGRESS NOTES
2022    Jaycob Suárez (:  1955) is a 74 y.o. male, here for a preventive medicine evaluation. Patient Active Problem List   Diagnosis    Essential hypertension    Colon polyps    Hypertrophy of prostate with urinary obstruction and other lower urinary tract symptoms (LUTS)    Malignant neoplasm of prostate ([facility] [address]    Nuclear senile cataract     Went to get [employer] and got tetanus    No other complaints  No urinary complaints    Is of colon polyps, last colonoscopy  was to return in 7 years for repeat. Has several spots on his face that are dry and will go away    Review of Systems   All other systems reviewed and are negative. Prior to Visit Medications    Medication Sig Taking?  Authorizing Provider   Multiple Vitamins-Minerals (THERAPEUTIC MULTIVITAMIN-MINERALS) tablet Take 1 tablet by mouth daily Yes Historical Provider, MD   valsartan-hydroCHLOROthiazide (DIOVAN-HCT) 160-12.5 MG per tablet TAKE ONE TABLET BY MOUTH DAILY  Henrry Thornton MD        Allergies   Allergen Reactions    No Known Allergies        Past Medical History:   Diagnosis Date    Prostate cancer ([facility] [address]     brachytherapy       Past Surgical History:   Procedure Laterality Date    CATARACT REMOVAL WITH IMPLANT Right     INGUINAL HERNIA REPAIR Left     injury to seminal vesicles, rendered him infertile       Social History     Socioeconomic History    Marital status:      Spouse name: Not on file    Number of children: 3    Years of education: Not on file    Highest education level: Not on file   Occupational History    Occupation: data,    Tobacco Use    Smoking status: Never    Smokeless tobacco: Never   Substance and Sexual Activity    Alcohol use: Not Currently    Drug use: Never    Sexual activity: Not on file   Other Topics Concern    Not on file   Social History Narrative    Runs 25 mi/week     Social Determinants of Health     Financial Resource Strain: Low Risk Difficulty of Paying Living Expenses: Not hard at all   Food Insecurity: No Food Insecurity    Worried About Running Out of Food in the Last Year: Never true    Ran Out of Food in the Last Year: Never true   Transportation Needs: Not on file   Physical Activity: Not on file   Stress: Not on file   Social Connections: Not on file   Intimate Partner Violence: Not on file   Housing Stability: Not on file        No family history on file. ADVANCE DIRECTIVE: N, <no information>    Vitals:    09/07/22 1544 09/07/22 1550   BP: (!) 160/80 130/78   Site: Right Upper Arm Right Upper Arm   Position: Sitting Sitting   Cuff Size: Large Adult Large Adult   Pulse: 71    Resp: 18    Temp: 97.4 °F (36.3 °C)    TempSrc: Infrared    SpO2: 95%    Weight: 194 lb 3.2 oz (88.1 kg)    Height: 6' (1.829 m)      Estimated body mass index is 26.34 kg/m² as calculated from the following:    Height as of this encounter: 6' (1.829 m). Weight as of this encounter: 194 lb 3.2 oz (88.1 kg). Physical Exam  Vitals reviewed. Constitutional:       General: He is not in acute distress. Appearance: Normal appearance. He is not ill-appearing. HENT:      Head: Normocephalic and atraumatic. Right Ear: Tympanic membrane, ear canal and external ear normal.      Left Ear: Tympanic membrane, ear canal and external ear normal.      Nose: Nose normal. No rhinorrhea. Mouth/Throat:      Mouth: Mucous membranes are moist.      Pharynx: Oropharynx is clear. No oropharyngeal exudate. Eyes:      General: No scleral icterus. Right eye: No discharge. Left eye: No discharge. Extraocular Movements: Extraocular movements intact. Conjunctiva/sclera: Conjunctivae normal.   Cardiovascular:      Rate and Rhythm: Normal rate and regular rhythm. Pulses: Normal pulses. Heart sounds: Normal heart sounds. No murmur heard. No gallop.    Pulmonary:      Effort: Pulmonary effort is normal.      Breath sounds: Normal breath sounds. No wheezing, rhonchi or rales. Abdominal:      General: Abdomen is flat. Bowel sounds are normal. There is no distension. Palpations: Abdomen is soft. There is no mass. Musculoskeletal:         General: Normal range of motion. Cervical back: Neck supple. No muscular tenderness. Lymphadenopathy:      Cervical: No cervical adenopathy. Skin:     General: Skin is warm. Capillary Refill: Capillary refill takes less than 2 seconds. Findings: No rash. Neurological:      General: No focal deficit present. Mental Status: He is alert. Cranial Nerves: No cranial nerve deficit. Psychiatric:         Mood and Affect: Mood normal.         Behavior: Behavior normal.       No flowsheet data found.     Lab Results   Component Value Date/Time    CHOL 161 11/13/2021 11:35 AM    CHOL 149 08/30/2019 07:10 AM    TRIG 100 11/13/2021 11:35 AM    TRIG 79 08/30/2019 07:10 AM    HDL 49 11/13/2021 11:35 AM    HDL 54 08/30/2019 07:10 AM    LDLCALC 92 11/13/2021 11:35 AM    LDLCALC 79 08/30/2019 07:10 AM    GLUCOSE 86 11/13/2021 11:35 AM       The 10-year ASCVD risk score (Alan Archibald, et al., 2013) is: 16.8%    Values used to calculate the score:      Age: 74 years      Sex: Male      Is Non- : No      Diabetic: No      Tobacco smoker: No      Systolic Blood Pressure: 510 mmHg      Is BP treated: Yes      HDL Cholesterol: 49 mg/dL      Total Cholesterol: 161 mg/dL    Immunization History   Administered Date(s) Administered    Influenza, FLUAD, (age 72 y+), Adjuvanted, 0.5mL 09/29/2021    Influenza, FLUZONE (age 72 y+), High Dose, 0.7mL 09/27/2020    Influenza, High Dose (Fluzone 65 yrs and older) 12/30/2019, 09/28/2020    Pneumococcal Polysaccharide ([id number]) 12/30/2020       Health Maintenance   Topic Date Due    COVID-19 Vaccine (1) Never done    DTaP/Tdap/Td vaccine (1 - Tdap) Never done    Shingles vaccine (1 of 2) Never done    Colorectal Cancer Screen 07/28/2021    Pneumococcal 65+ years Vaccine (2 - PCV) 12/30/2021    Flu vaccine (1) 09/01/2022    Prostate Specific Antigen (PSA) Screening or Monitoring  01/03/2023    Depression Screen  09/07/2023    Lipids  11/13/2026    Hepatitis C screen  Completed    Hepatitis A vaccine  Aged Out    Hepatitis B vaccine  Aged Out    Hib vaccine  Aged Out    Meningococcal (ACWY) vaccine  Aged Out       Assessment & Plan   Encounter for annual physical exam  -     Lipid Panel; Future  -     Comprehensive Metabolic Panel; Future  -     CBC with Auto Differential; Future  -     MICROSCOPIC URINALYSIS; Future  Malignant neoplasm of prostate ([facility] [address]  -Per urology  Essential hypertension  Chronic stable continue with current regimen  Actinic keratoses  -     Wiliam Anthony MD, Dermatology, [facility]    Get colonoscopy  General wellness exam. Reviewed chart for past hx and updated today. Counseled on age appropriate health guidance and discussed screening recommendations. Vaccinations reviewed and discussed. All questions answered'  Return in about 6 months (around 3/7/2023) for chronic problems.          --Jesus Alberto Hoang MD

## 2022-09-12 RX ORDER — VALSARTAN AND HYDROCHLOROTHIAZIDE 160; 12.5 MG/1; MG/1
TABLET, FILM COATED ORAL
Qty: 30 TABLET | Refills: 5 | Status: SHIPPED | OUTPATIENT
Start: 2022-09-12

## 2022-09-12 NOTE — TELEPHONE ENCOUNTER
Medication:   Requested Prescriptions     Pending Prescriptions Disp Refills    valsartan-hydroCHLOROthiazide (DIOVAN-HCT) 160-12.5 MG per tablet [Pharmacy Med Name: Tyler Urbano 160-12.5 MG TAB] 30 tablet      Sig: TAKE ONE TABLET BY MOUTH DAILY     Last Filled:  9.7.22    Last appt: 9/7/2022   Next appt: Visit date not found    Last OARRS: No flowsheet data found.

## 2022-09-23 DIAGNOSIS — Z00.00 ENCOUNTER FOR ANNUAL PHYSICAL EXAM: ICD-10-CM

## 2022-09-23 LAB
A/G RATIO: 1.8 (ref 1.1–2.2)
ALBUMIN SERPL-MCNC: 4.4 G/DL (ref 3.4–5)
ALP BLD-CCNC: 98 U/L (ref 40–129)
ALT SERPL-CCNC: 19 U/L (ref 10–40)
ANION GAP SERPL CALCULATED.3IONS-SCNC: 11 MMOL/L (ref 3–16)
AST SERPL-CCNC: 22 U/L (ref 15–37)
BACTERIA: NORMAL /HPF
BASOPHILS ABSOLUTE: 0 K/UL (ref 0–0.2)
BASOPHILS RELATIVE PERCENT: 0.8 %
BILIRUB SERPL-MCNC: 1.1 MG/DL (ref 0–1)
BUN BLDV-MCNC: 20 MG/DL (ref 7–20)
CALCIUM SERPL-MCNC: 9.5 MG/DL (ref 8.3–10.6)
CHLORIDE BLD-SCNC: 102 MMOL/L (ref 99–110)
CHOLESTEROL, TOTAL: 144 MG/DL (ref 0–199)
CO2: 27 MMOL/L (ref 21–32)
CREAT SERPL-MCNC: 1.1 MG/DL (ref 0.8–1.3)
EOSINOPHILS ABSOLUTE: 0 K/UL (ref 0–0.6)
EOSINOPHILS RELATIVE PERCENT: 0.8 %
EPITHELIAL CELLS, UA: 0 /HPF (ref 0–5)
GFR AFRICAN AMERICAN: >60
GFR NON-AFRICAN AMERICAN: >60
GLUCOSE BLD-MCNC: 95 MG/DL (ref 70–99)
HCT VFR BLD CALC: 38.3 % (ref 40.5–52.5)
HDLC SERPL-MCNC: 54 MG/DL (ref 40–60)
HEMOGLOBIN: 13 G/DL (ref 13.5–17.5)
HYALINE CASTS: 0 /LPF (ref 0–8)
LDL CHOLESTEROL CALCULATED: 79 MG/DL
LYMPHOCYTES ABSOLUTE: 1.8 K/UL (ref 1–5.1)
LYMPHOCYTES RELATIVE PERCENT: 33.4 %
MCH RBC QN AUTO: 33.4 PG (ref 26–34)
MCHC RBC AUTO-ENTMCNC: 34 G/DL (ref 31–36)
MCV RBC AUTO: 98.3 FL (ref 80–100)
MONOCYTES ABSOLUTE: 0.6 K/UL (ref 0–1.3)
MONOCYTES RELATIVE PERCENT: 11 %
NEUTROPHILS ABSOLUTE: 2.9 K/UL (ref 1.7–7.7)
NEUTROPHILS RELATIVE PERCENT: 54 %
PDW BLD-RTO: 13.9 % (ref 12.4–15.4)
PLATELET # BLD: 241 K/UL (ref 135–450)
PMV BLD AUTO: 8.1 FL (ref 5–10.5)
POTASSIUM SERPL-SCNC: 4.2 MMOL/L (ref 3.5–5.1)
RBC # BLD: 3.9 M/UL (ref 4.2–5.9)
RBC UA: 1 /HPF (ref 0–4)
SODIUM BLD-SCNC: 140 MMOL/L (ref 136–145)
TOTAL PROTEIN: 6.8 G/DL (ref 6.4–8.2)
TRIGL SERPL-MCNC: 57 MG/DL (ref 0–150)
URINE TYPE: NORMAL
VLDLC SERPL CALC-MCNC: 11 MG/DL
WBC # BLD: 5.4 K/UL (ref 4–11)
WBC UA: 0 /HPF (ref 0–5)

## 2022-10-28 ENCOUNTER — OFFICE VISIT (OUTPATIENT)
Dept: DERMATOLOGY | Age: 67
End: 2022-10-28
Payer: MEDICARE

## 2022-10-28 DIAGNOSIS — D22.60 MULTIPLE BENIGN MELANOCYTIC NEVI OF UPPER EXTREMITY, LOWER EXTREMITY, AND TRUNK: ICD-10-CM

## 2022-10-28 DIAGNOSIS — L82.1 OTHER SEBORRHEIC KERATOSIS: ICD-10-CM

## 2022-10-28 DIAGNOSIS — D22.5 MULTIPLE BENIGN MELANOCYTIC NEVI OF UPPER EXTREMITY, LOWER EXTREMITY, AND TRUNK: ICD-10-CM

## 2022-10-28 DIAGNOSIS — L81.4 LENTIGINES: ICD-10-CM

## 2022-10-28 DIAGNOSIS — Z85.828 HISTORY OF BASAL CELL CARCINOMA (BCC): ICD-10-CM

## 2022-10-28 DIAGNOSIS — D22.70 MULTIPLE BENIGN MELANOCYTIC NEVI OF UPPER EXTREMITY, LOWER EXTREMITY, AND TRUNK: ICD-10-CM

## 2022-10-28 DIAGNOSIS — L57.0 ACTINIC KERATOSIS: Primary | ICD-10-CM

## 2022-10-28 DIAGNOSIS — D48.5 NEOPLASM OF UNCERTAIN BEHAVIOR OF SKIN: ICD-10-CM

## 2022-10-28 PROCEDURE — 1123F ACP DISCUSS/DSCN MKR DOCD: CPT | Performed by: INTERNAL MEDICINE

## 2022-10-28 PROCEDURE — 17003 DESTRUCT PREMALG LES 2-14: CPT | Performed by: INTERNAL MEDICINE

## 2022-10-28 PROCEDURE — 17000 DESTRUCT PREMALG LESION: CPT | Performed by: INTERNAL MEDICINE

## 2022-10-28 PROCEDURE — 11103 TANGNTL BX SKIN EA SEP/ADDL: CPT | Performed by: INTERNAL MEDICINE

## 2022-10-28 PROCEDURE — 99203 OFFICE O/P NEW LOW 30 MIN: CPT | Performed by: INTERNAL MEDICINE

## 2022-10-28 PROCEDURE — 11102 TANGNTL BX SKIN SINGLE LES: CPT | Performed by: INTERNAL MEDICINE

## 2022-10-28 NOTE — PATIENT INSTRUCTIONS
Thank you for visiting [facility] today! Please follow the instructions below as we discussed in clinic:      I will call you with biopsy results in 7-10 days    Biopsy Wound Care Instructions  Cleanse the wound with mild soapy water daily. Gently dry the area. Apply Vaseline or petroleum jelly to the wound using a cotton tipped applicator. Cover with a clean bandage. Repeat this process until the biopsy site is healed. If you had stitches placed, continue treating the site until the stitches are removed. Remember to make an appointment to return to have your stitches removed by our staff. You may shower and bathe as usual.   ** Biopsy results generally take around 7 business days to come back. If you have not heard from us by then, please call the office at [phone] between 8AM and 4PM Monday through Friday. SUNSCREENS: UVA and UVB PROTECTION    Sunlight consists of two types of light that can cause or worsen most skin problems:    UVA (ultraviolet A)  UVB (ultraviolet B)   Causes brown spots/sun spots Causes skin cancer   Causes wrinkles Causes sunburn   Causes aging Responsible for tanning    Worsens rosacea Strongest in summer    Less variation with seasons Peak hours 10am-2pm   All year round  SPF rates UVB protection    All day strong    No rating system available     Passes through glass and clouds      *Sunscreens that block both UVA and UVB light contain:  Zinc Oxide (should contain at least 5% zinc oxide)  Titanium Dioxide  Parsol or Avobenzone (additives improve stability of Avobenzone)  There are other UVA blocking ingredients but they are not as complete as these three. Tips/Suggestions  1) Always use sunscreens with SPF of 30 or higher  2) Make sure the sunscreen has zinc oxide or other UVA block such as Helioplex or Anthelios in product  3) Remember there is no \"safe UV light:. ..so there is no such thing as a safe suntan.   4) Apply sunscreen daily (even in winter and on cloudy days) and reapply every 2 to 4 hours depending on activity. 5) Approximately one ounce (a shot glass) is necessary to adequately cover the entire body. 6) Approximately one teaspoon is necessary to adequately cover the face    TINTED SUNSCREENS  - La Roche Posay. Anthelios mineral tinted sunscreen. SPF. 50  Avene. Solaire UV Mineral Multi-Defense Tinted Sunscreen SPF 50+   Avene. Mineral Tinted Compact SPF 48. Tizo 3 facial primer tinted SPF 40  Eltamd Uv Glow Tinted Broad-Spectrum Spf 36  Eltamd Uv Restore Tinted Broad-Spectrum Spf 40   Eltamd Uv Physical Broad-Spectrum Spf 41   Eltamd Uv Elements Broad-Spectrum Spf 44   Dermablend. Cover Creme. High-performance cream foundation for maximum coverage SPF 30   Vichy. [employer] Ashley Tinted 100% Mineral Sunscreen SPF 60%. Vichy. [address] CORRECTIVE FLUID [facility]. [address] [facility]. Isdin. Eryfotona Ageless. Ultralight tinted mineral sunscreen. Isdin. Isdinceutics Mineral Brush. Supergoop Mineral CC cream (Comes in several shades, friendly for pigmented skin)  CoTz Flawless Complexion SPF 50 tinted  CoTz Face Prime and Protect SPF 40 tinted    CHEMICAL SUNSCREEN  - La Roche-Posay Anthelios Melt-in Milk Body Face Sunscreen Lotion Broad Spectrum SPF 61 or 100   - La Roche-Posay ANTHELIOS COOLING WATER SUNSCREEN LOTION SPF 60  - La Roche-Posay ANTHELIOS LOTION SPRAY SUNSCREEN SPF 60  - La Roche-Posay ANTHELIOS ACTIVEWEAR SPORT SUNSCREEN LOTION SPF 60    MOISTURIZER WITH CHEMICAL SUNSCREEN NON-COMEDOGENIC  - CeraVe Facial Moisturizing Lotion AM with Sunscreen, Broad Spectrum SPF 30: Ceramides/niacinamide/HA. - CeraVe Ultra-Light Moisturizing Lotion with Sunscreen, Broad Spectrum SPF 30. Ceramides/HA   - La Roche-Posay Toleriane Double Repair Moisturizer SPF 27. Ceramine/Niacinamide. Very dry skin  - Eucerin Daily Protection. Moisturizing Face Lotion Sunscreen SPF 30  - Cetaphil® PRO Dermacontrol.  Oil Absorbing Moisturizer SPF 30: Very oily skin  - Cetaphil® Daily Facial Moisturizer SPF 48: Dry skin  - Neutrogena Visibly Even Daily Moisturizer with Sunscreen, Broad Spectrum SPF 30  - Neutrogena Fuentes Grumman Gel Sunscreen, Broad Spectrum SPF 25  - Aveeno Positively Radiant Daily Moisturizer, Broad Spectrum SPF 30    MOISTURIZER WITH PHYSICAL SUNSCREEN. NON-COMEDOGENIC  - Neutrogena. Ultra-Calming Daily Tresia Cluck MD UV Physical Broad Spectrum SPF 39. Water resistant.  - La Roche-Posay Anthelios SPF 50 Ultra Light Mineral Sunscreen Fluid. Water resistant  - La Roche-Posay Anthelios 100% Mineral Sunscreen Moisturizer with Hyaluronic Acid  - COOLA Mineral Face SPF 30 Matte Tint Moisturizer. Water resistant  - Avene SPF 50+ Mineral Light Hydrating Sunscreen Lotion. Water resistant. - CeraVe Skin Renewing Day Cream Broad Spectrum SPF 27  - Aveeno Ultra-Calming Daily Moisturizer Broad Spectrum SPF 30    The following are some examples of vendors of sun protective clothing:  - Coolibar (www.coolibar. com)  - Sun Precautions (www.sunprecautions. com)  - Sunday Afternoons (www.sundayaftEmunamedica. 5to1)  Sherriagusto Wortham (www.Paragon 28)  - Fora life (www.100e.com. 5to1)    Remember the best sunscreen is whichever one you will wear every day!

## 2022-10-28 NOTE — PROGRESS NOTES
[facility]  Maye Meraz MD  [phone]    Date of Visit: 10/28/2022    Minal Vargas is a 74 y.o. male who presents for skin lesions. New pt    Chief Complaint:   Chief Complaint   Patient presents with    Skin Lesion     On bilateral legs,    Skin Exam     Hx of skin cancer        History of Present Illness:    Patient denies any other new or changing skin lesions. They would like all of their skin lesions to be evaluated for skin cancer. *Personal history of skin cancer:   -BCC on RLE and scalp many years ago  *Family history of skin cancer: None     Review of Systems:  Gen: Feels well, good sense of health. Skin: No new or changing moles, no history of keloids or hypertrophic scars. Past Medical History, Family History, Surgical History, Medications and Allergies reviewed. Past Medical History:   Diagnosis Date    Prostate cancer [facility]) 2013    brachytherapy     Past Surgical History:   Procedure Laterality Date    CATARACT REMOVAL WITH IMPLANT Right 2013    INGUINAL HERNIA REPAIR Left 1971    injury to seminal vesicles, rendered him infertile       Allergies   Allergen Reactions    No Known Allergies      Outpatient Medications Marked as Taking for the 10/28/22 encounter (Office Visit) with Parag Rubio MD   Medication Sig Dispense Refill    valsartan-hydroCHLOROthiazide (DIOVAN-HCT) 160-12.5 MG per tablet TAKE ONE TABLET BY MOUTH DAILY 30 tablet 5    Multiple Vitamins-Minerals (THERAPEUTIC MULTIVITAMIN-MINERALS) tablet Take 1 tablet by mouth daily         Social History: Runs marathons/half marathons; 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 Mood clear/affect appropriate. Alert and oriented. Mucous membranes moist.  Sclera anicteric.     Full body skin exam was conducted to include the scalp, face, lips/teeth, lids/conjunctiva, ears, neck, chest, abdomen, back, groin/buttock, right and left hands and forearms, right and left leg and feet and was normal with the following exceptions:   -Pink scaly plaques on R abdomen, L dorsal thigh, L lower medial leg  ill defined keratotic pink macules on scalp, L cheek/forehead, L ear x5  - On trunk and bilateral upper and lower extremities, there are multiple well-circumscribed, homogenous tan to brown macules and papules   - Multiple tan to light brown macules on face, shoulders and arms in a photo-distributed pattern  -Scattered, brown, stuck-on appearing, verrucous papules on trunk and extremities  -Well healed scar(s) at site of prior skin cancer procedures      Assessment and Plan     1. Actinic keratosis  - Counseled on diagnosis, etiology, natural disease course- including pre-malignant nature of lesions and association with sun exposure  - Counseled on importance of daily sun protection (SPF 30+, UVA/UVB) and monthly self skin exams  -Cryotherapy was discussed and patient agreed to proceed. Consent was obtained. 5 AKs were treated cryotherapy on face, L ear. 2 cycles of liquid nitrogen applied to each lesion for 5 seconds using a Cry-Ac cryo spray gun. Patient was educated regarding the potential risks of blister formation and discomfort. Wound care was discussed. The patient tolerated the procedure well and there were no immediate complications. 2. Multiple benign melanocytic nevi of upper extremity, lower extremity, and trunk  - Benign, reassurance  - Counseled on importance of daily sun protection (Broad spectrum, SPF >30), monthly self skin exams  - Reviewed ABCDEs of melanoma  - Sun screen hand out provided      3. Lentigines  -Benign, reassurance   - Reviewed relationship with sun exposure  - Rec daily sunscreen with SPF 30, broad spectrum    4. Other seborrheic keratosis  -Benign, reassurance       5.  Neoplasm of uncertain behavior of skin  -R abdomen, L dorsal thigh, L medial lower leg ddx includes smBCC vs. SCCIS  *Shave biopsy procedure note:  -The procedure was discussed in detail. We also reviewed the risks of bleeding, scar, and infection. A consent form was signed by the patient.   -The lesion (s) to be removed on R abdomen, L dorsal thigh, L medial lower leg were marked with a surgical pen. Alcohol was used to cleanse the site. Local anesthesia was acheived with 1% lidocaine with epinephrine. Shave removal of the lesion was performed down to mid dermis using a razor blade. Hemostasis was achieved with aluminum chloride. The wound was dressed with petrolatum and covered with a bandage. Wound care instructions were reviewed. Specimen (s) sent to pathology. The specimen bottle(s) were appropriately labeled.    -The patient tolerated the procedure well and there were no immediate complications. -EDC vs. Efudex    6. History of basal cell carcinoma (BCC)  -NER    Return in about 6 months (around 4/28/2023). Note is transcribed using voice recognition software. Inadvertent computerized transcription errors may be present.     Ashlyn Monson MD

## 2022-11-01 LAB — DERMATOLOGY PATHOLOGY REPORT: ABNORMAL

## 2022-12-08 ENCOUNTER — OFFICE VISIT (OUTPATIENT)
Dept: DERMATOLOGY | Age: 67
End: 2022-12-08

## 2022-12-08 DIAGNOSIS — C44.719 BASAL CELL CARCINOMA (BCC) OF LEFT LOWER LEG: ICD-10-CM

## 2022-12-08 DIAGNOSIS — C44.519 BASAL CELL CARCINOMA (BCC) OF ABDOMEN: Primary | ICD-10-CM

## 2022-12-08 DIAGNOSIS — C44.719 BASAL CELL CARCINOMA (BCC) OF LEFT THIGH: ICD-10-CM

## 2022-12-08 NOTE — PROGRESS NOTES
Nora Vargas presents for [facility]. See procedure note below    [facility]  The provider discussed the risks, benefits, and alternatives to [facility] including pain, bleeding, infection, scar, and damage to either sensory or motor nerves. These are inherent with any surgery, and everything possible will be done to minimize these risks with this procedure. The benefits include removal of the skin cancer with the fewest possible risks and the most cost effective treatment for this particular cancer and area. PRE PROCEDURE TIME OUT:  Site confirmed using dermpath report, photographs and residual biopsy scar  with patient by:  Dr. Erica Bedolla MD      *Pathology results:  A. RIGHTABDOMEN-   Basal Cell Carcinoma, Superficial Type   Pre-procedure Size:    0.9 cm x 0.9 cm  Post-treatment size after 1st pass: 1.1 cm x 1.1 cm    B. LEFT DORSAL THIGH-   Basal Cell Carcinoma, Superficial Type   Pre-procedure Size:    0.9 cm x 0.9 cm  Post-treatment size after 1st pass: 1.1 cm x 1.1cm    C. LEFT MEDIAL LOWER LEG-   Basal Cell Carcinoma, Superficial Type   Pre-procedure Size:    0.8 cm x 0.8 cm  Post-treatment size after 1st pass: 1cm x 1cm      Verbal informed consent was obtained. The area was not photographed. The area was cleaned with alcohol and infiltrated with 1% lidocaine with epinephrine. After adequate analgesia had been confirmed, the area was treated with curettage followed by electrodesiccation. There were 3 passes total.  Hemostasis was achieved with electrodesiccation. 100% petroleum jelly and a sterile dressing were applied and written wound care instructions were given to the patient verbally and in writing. The patient tolerated the procedure well.       Pt running last half marathon flying pig in Janeth Arceo MD

## 2022-12-08 NOTE — PATIENT INSTRUCTIONS
Wound Care Instructions  Cleanse the wound with mild soapy water daily. Gently dry the area. Apply Vaseline or petroleum jelly to the wound using a cotton tipped applicator. Cover with a clean bandage. Repeat this process until the biopsy site is healed. If you had stitches placed, continue treating the site until the stitches are removed. Remember to make an appointment to return to have your stitches removed by our staff. You may shower and bathe as usual.   ** Biopsy results generally take around 7 business days to come back. If you have not heard from us by then, please call the office at [phone] between 8AM and 4PM Monday through Friday. 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 No events overnight. Patient denies headache, changes in vision, chest pain, sob. Slept well and looking forward to PT. Daughter at bedside     ALLERGIES:  No Known Allergies    MEDICATIONS  (STANDING):  amLODIPine   Tablet 10 milliGRAM(s) Oral daily  enoxaparin Injectable 40 milliGRAM(s) SubCutaneous <User Schedule>  folic acid 1 milliGRAM(s) Oral daily  hydrALAZINE 25 milliGRAM(s) Oral two times a day  influenza  Vaccine (HIGH DOSE) 0.7 milliLiter(s) IntraMuscular once  lisinopril 40 milliGRAM(s) Oral daily  multivitamin 1 Tablet(s) Oral daily  polyethylene glycol 3350 17 Gram(s) Oral two times a day  QUEtiapine 12.5 milliGRAM(s) Oral at bedtime  senna 2 Tablet(s) Oral at bedtime  thiamine 100 milliGRAM(s) Oral daily    MEDICATIONS  (PRN):  acetaminophen     Tablet .. 650 milliGRAM(s) Oral every 6 hours PRN Mild Pain (1 - 3)  QUEtiapine 12.5 milliGRAM(s) Oral every 6 hours PRN agitation    Vital Signs Last 24 Hrs  T(F): 98.1 (02 Dec 2022 08:53), Max: 98.2 (01 Dec 2022 19:39)  HR: 73 (02 Dec 2022 09:29) (60 - 79)  BP: 111/74 (02 Dec 2022 09:29) (103/64 - 121/73)  RR: 16 (02 Dec 2022 09:29) (16 - 16)  SpO2: 98% (02 Dec 2022 09:29) (97% - 98%)  I&O's Summary      PHYSICAL EXAM:  GENERAL: NAD, sitting in chair, slightly confused but follows commands  EYES: PERRL, conjunctiva and sclera clear, ecchymosis on lateral corner of L eye  ENMT: Moist mucous membranes, Good dentition  CHEST/LUNG: Clear to auscultation bilaterally, non-labored breathing, good air entry  HEART: RRR; S1/S2, No murmur  ABDOMEN: Soft, Nontender, Nondistended; Bowel sounds present  EXTREMITIES: No cyanosis, No edema, 5/5 strength in all extremities  SKIN: Warm, perfused  NERVOUS SYSTEM:  A/O x2 (name, hospital, but not date), moves all extremities    LABS:                        12.8   4.47  )-----------( 360      ( 01 Dec 2022 06:13 )             38.2     12-01    136  |  102  |  11  ----------------------------<  105  3.8   |  27  |  0.66    Ca    9.2      01 Dec 2022 06:13    TPro  7.2  /  Alb  3.3  /  TBili  0.2  /  DBili  x   /  AST  101  /  ALT  147  /  AlkPhos  85  12-01              11-19 Chol 250 mg/dL LDL -- HDL 64 mg/dL Trig 118 mg/dL                      Culture - Urine (collected 26 Nov 2022 06:31)  Source: Clean Catch Clean Catch (Midstream)  Final Report (28 Nov 2022 16:48):    >100,000 CFU/ml Morganella morganii    <10,000 CFU/ml Normal Urogenital cristiana present  Organism: Morganella morganii (28 Nov 2022 16:48)  Organism: Morganella morganii (28 Nov 2022 16:48)      -  Amikacin: S <=16      -  Amoxicillin/Clavulanic Acid: R >16/8      -  Ampicillin: R >16 These ampicillin results predict results for amoxicillin      -  Ampicillin/Sulbactam: R >16/8 Enterobacter, Klebsiella aerogenes, Citrobacter, and Serratia may develop resistance during prolonged therapy (3-4 days)      -  Aztreonam: S <=4      -  Cefazolin: R >16      -  Cefepime: S <=2      -  Cefoxitin: S <=8      -  Ceftriaxone: S <=1 Enterobacter, Klebsiella aerogenes, Citrobacter, and Serratia may develop resistance during prolonged therapy      -  Ciprofloxacin: S <=0.25      -  Ertapenem: S <=0.5      -  Gentamicin: S <=2      -  Imipenem: R 4      -  Levofloxacin: S <=0.5      -  Meropenem: S <=1      -  Nitrofurantoin: R >64 Should not be used to treat pyelonephritis      -  Piperacillin/Tazobactam: S <=8      -  Tobramycin: S <=2      -  Trimethoprim/Sulfamethoxazole: S <=0.5/9.5      Method Type: TIAGO      COVID-19 PCR: NotDetec (11-29-22 @ 17:45)  COVID-19 PCR: NotDetec (11-27-22 @ 18:38)      RADIOLOGY & ADDITIONAL TESTS:    Care Discussed with Consultants/Other Providers:

## 2023-03-27 ENCOUNTER — TELEPHONE (OUTPATIENT)
Dept: DERMATOLOGY | Age: 68
End: 2023-03-27

## 2023-03-27 NOTE — TELEPHONE ENCOUNTER
Called and left message for patient to call back office. If patient returns call please offer 3/29/23 at 11:30 am if patient available.

## 2023-03-27 NOTE — TELEPHONE ENCOUNTER
Called and left message for patient to call back office. If patient returns call please offer today at 2:15 pm or tomorrow at 3 pm if patient available.

## 2023-03-27 NOTE — TELEPHONE ENCOUNTER
If the patient calls back can you clarify that I can no longer see him at the 11:45am visit on 3/31  so he would either have to take one of the dates that Jeremiah Lorenzo mentioned or reschedule for another Friday opening.     Thanks,  Tati Contreras MD

## 2023-03-28 ENCOUNTER — PATIENT MESSAGE (OUTPATIENT)
Dept: DERMATOLOGY | Age: 68
End: 2023-03-28

## 2023-04-21 ENCOUNTER — OFFICE VISIT (OUTPATIENT)
Dept: DERMATOLOGY | Age: 68
End: 2023-04-21

## 2023-04-21 DIAGNOSIS — Z85.828 HISTORY OF BASAL CELL CARCINOMA (BCC): ICD-10-CM

## 2023-04-21 DIAGNOSIS — L81.4 LENTIGINES: ICD-10-CM

## 2023-04-21 DIAGNOSIS — L57.0 ACTINIC KERATOSIS: Primary | ICD-10-CM

## 2023-04-21 DIAGNOSIS — D22.60 MULTIPLE BENIGN MELANOCYTIC NEVI OF UPPER EXTREMITY, LOWER EXTREMITY, AND TRUNK: ICD-10-CM

## 2023-04-21 DIAGNOSIS — D22.70 MULTIPLE BENIGN MELANOCYTIC NEVI OF UPPER EXTREMITY, LOWER EXTREMITY, AND TRUNK: ICD-10-CM

## 2023-04-21 DIAGNOSIS — L84 CORN: ICD-10-CM

## 2023-04-21 DIAGNOSIS — D22.5 MULTIPLE BENIGN MELANOCYTIC NEVI OF UPPER EXTREMITY, LOWER EXTREMITY, AND TRUNK: ICD-10-CM

## 2023-04-21 DIAGNOSIS — L82.1 OTHER SEBORRHEIC KERATOSIS: ICD-10-CM

## 2023-04-21 NOTE — PATIENT INSTRUCTIONS
resistant  - Avene SPF 50+ Mineral Light Hydrating Sunscreen Lotion. Water resistant. - CeraVe Skin Renewing Day Cream Broad Spectrum SPF 27  - Aveeno Ultra-Calming Daily Moisturizer Broad Spectrum SPF 30    The following are some examples of vendors of sun protective clothing:  - Coolibar (www.coolibar. [employer])  - Sun Precautions (www.sunprecautions. [employer])  - Sunday Afternoons (www.sundayaTelerad Express. [employer])  Comfort Thurman (www.[employer])  - Shaila life (www.[employer]. [employer])    Remember the best sunscreen is whichever one you will wear every day!

## 2023-04-21 NOTE — PROGRESS NOTES
[facility] Dermatology  Jennifer Willis MD  [phone]    Date of Visit: 4/21/2023  LV 10/2022    Meryl Peres is a 69 y.o. male who presents for skin lesions. Chief Complaint:   Chief Complaint   Patient presents with    Follow-up     Skin exam        History of Present Illness:    Patient denies any other new or changing skin lesions. They would like all of their skin lesions to be evaluated for skin cancer. *Personal history of skin cancer:   -BCC, superficial s/p ED&C 12/2022  -BCC, superficial s/p ED&C 12/2022  -BCC, superficial s/p ED&C 12/2022  -BCC on RLE and scalp many years ago  *Family history of skin cancer: None     Review of Systems:  Gen: Feels well, good sense of health. Skin: No new or changing moles, no history of keloids or hypertrophic scars. Past Medical History, Family History, Surgical History, Medications and Allergies reviewed. 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 Mood clear/affect appropriate. Alert and oriented. Mucous membranes moist.  Sclera anicteric.     Full body skin exam was conducted to include the scalp, face, lips, lids, ears, neck, chest, abdomen, back,

## 2023-05-08 ENCOUNTER — OFFICE VISIT (OUTPATIENT)
Dept: DERMATOLOGY | Age: 68
End: 2023-05-08
Payer: COMMERCIAL

## 2023-05-08 DIAGNOSIS — L84 CORN: Primary | ICD-10-CM

## 2023-05-08 PROCEDURE — G8427 DOCREV CUR MEDS BY ELIG CLIN: HCPCS | Performed by: INTERNAL MEDICINE

## 2023-05-08 PROCEDURE — 99213 OFFICE O/P EST LOW 20 MIN: CPT | Performed by: INTERNAL MEDICINE

## 2023-05-08 PROCEDURE — 1036F TOBACCO NON-USER: CPT | Performed by: INTERNAL MEDICINE

## 2023-05-08 PROCEDURE — 1123F ACP DISCUSS/DSCN MKR DOCD: CPT | Performed by: INTERNAL MEDICINE

## 2023-05-08 PROCEDURE — G8417 CALC BMI ABV UP PARAM F/U: HCPCS | Performed by: INTERNAL MEDICINE

## 2023-05-08 PROCEDURE — 3017F COLORECTAL CA SCREEN DOC REV: CPT | Performed by: INTERNAL MEDICINE

## 2023-05-08 NOTE — PROGRESS NOTES
[facility] Dermatology  Jennifer Willis MD  [phone]    Date of Visit: 5/8/2023  LV 4/2023    Meryl Peres is a 69 y.o. male who presents for skin lesions. Chief Complaint:   Chief Complaint   Patient presents with    Follow-up     Corn on L foot        History of Present Illness:    Concern: Corn on L lateral foot  Sx: Painful when rubbed  Duration: Many months     *Personal history of skin cancer:   -BCC, superficial s/p ED&C 12/2022  -BCC, superficial s/p ED&C 12/2022  -BCC, superficial s/p ED&C 12/2022  -BCC on RLE and scalp many years ago  *Family history of skin cancer: None     Review of Systems:  Gen: Feels well, good sense of health. Skin: No new or changing moles, no history of keloids or hypertrophic scars. Past Medical History, Family History, Surgical History, Medications and Allergies reviewed. 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 Mood clear/affect appropriate. Alert and oriented. Mucous membranes moist.  Sclera anicteric. Visible skin exam was conducted to include the scalp, face, lips, lids, ears, neck, right and left hands and L foot, forearms and was normal with the following exceptions:   -Hyperkeratotic papule on L lateral foot      Assessment and Plan     1. Corn, L foot  -After consent, area was cleansed with alcohol and 4mm currette was used to pare down wart.  Pt tolerated well  -Start OTC Urea cream

## 2023-05-08 NOTE — PATIENT INSTRUCTIONS
Thank you for visiting [facility] Dermatology today!  Please follow the instructions below as we discussed in clinic:      Start urea cream or Amlactin cream

## 2023-06-01 ENCOUNTER — TELEPHONE (OUTPATIENT)
Dept: PRIMARY CARE CLINIC | Age: 68
End: 2023-06-01

## 2023-06-21 ENCOUNTER — TELEPHONE (OUTPATIENT)
Dept: DERMATOLOGY | Age: 68
End: 2023-06-21

## 2023-06-21 NOTE — TELEPHONE ENCOUNTER
Pt calling states he has a bright red spot on his Lt ear states he bleed some the other day have some concern pls return call back @ [phone] to discuss

## 2023-06-23 ENCOUNTER — OFFICE VISIT (OUTPATIENT)
Dept: DERMATOLOGY | Age: 68
End: 2023-06-23

## 2023-06-23 DIAGNOSIS — D69.2 PURPURA (HCC): ICD-10-CM

## 2023-06-23 DIAGNOSIS — L57.0 ACTINIC KERATOSIS: Primary | ICD-10-CM

## 2023-06-23 PROCEDURE — 1036F TOBACCO NON-USER: CPT | Performed by: INTERNAL MEDICINE

## 2023-06-23 PROCEDURE — G8417 CALC BMI ABV UP PARAM F/U: HCPCS | Performed by: INTERNAL MEDICINE

## 2023-06-23 PROCEDURE — G8427 DOCREV CUR MEDS BY ELIG CLIN: HCPCS | Performed by: INTERNAL MEDICINE

## 2023-06-23 PROCEDURE — 1123F ACP DISCUSS/DSCN MKR DOCD: CPT | Performed by: INTERNAL MEDICINE

## 2023-06-23 PROCEDURE — 3017F COLORECTAL CA SCREEN DOC REV: CPT | Performed by: INTERNAL MEDICINE

## 2023-06-23 NOTE — PROGRESS NOTES
[facility] Dermatology  Dotty Owens MD  [phone]    Date of Visit: 6/23/2023  LV 5/2023    Hemanth Saleh is a 69 y.o. male who presents for skin lesions. Chief Complaint   Patient presents with    Skin Lesion     Spot left ear, right arm       History of Present Illness:    Concern: New spot on ear    Concern: New spot on R arm  Duration: Since last visit     *Personal history of skin cancer:   -BCC, superficial s/p ED&C 12/2022  -BCC, superficial s/p ED&C 12/2022  -BCC, superficial s/p ED&C 12/2022  -BCC on RLE and scalp many years ago  *Family history of skin cancer: None     Review of Systems:  Gen: Feels well, good sense of health. Skin: No new or changing moles, no history of keloids or hypertrophic scars. Past Medical History, Family History, Surgical History, Medications and Allergies reviewed. 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 Mood clear/affect appropriate. Alert and oriented. Mucous membranes moist.  Sclera anicteric.   Visible skin exam was conducted to include the scalp, face, lips, lids, ears, neck, right and left hands forearms and was normal with the following exceptions:

## 2023-06-23 NOTE — PATIENT INSTRUCTIONS
Thank you for visiting [facility] Dermatology today! Please follow the instructions below as we discussed in clinic:      Cryosurgery (Freezing) Wound Care Instructions    AFTER THE PROCEDURE:   You will notice swelling and redness around the site. This is normal.   You may experience a sharp or sore feeling for the next several days. For this discomfort, you may take acetaminophen (Tylenol©). A blister may develop at the treated area, sometimes as soon as by the end of the day. After several days, the blister will subside and a scab will form. If the area is bumped or traumatized during the first few days following freezing, you may develop bleeding into the blister, forming a blood blister. This is nothing to be alarmed about. If the blister is tense, uncomfortable, or much larger than the site that was frozen, you may pop the blister along its edge with a sterile needle (boiled, heated under a flame, or cleaned with alcohol) to allow the fluid to drain out. If the blister does not bother you, no treatment is needed. Do NOT peel off the top of the blister roof. It will act as a dressing on top of your wound. WOUND CARE:   You may shower or bathe as usual, but avoid scrubbing the areas that have been frozen. Cleanse the site twice a day with mild soapy water, and then apply a thin film of white petrolatum (Vaseline©). You do not need to cover the area, but can if you prefer. Do NOT allow the site to become dry or crusted, or attempt to dry it out with rubbing alcohol or hydrogen peroxide. Continue this regimen until the area is pink and healed. Depending on the size and location of your cryosurgery site, healing may take 2 to 4 weeks. The area may continue to be pink for several weeks, and over the next few months may become darker or lighter than the surrounding skin. This may be a permanent change.       SUNSCREENS: UVA and UVB PROTECTION    Sunlight consists of two types of light that can cause or

## 2023-07-10 RX ORDER — VALSARTAN AND HYDROCHLOROTHIAZIDE 160; 12.5 MG/1; MG/1
TABLET, FILM COATED ORAL
Qty: 30 TABLET | Refills: 5 | OUTPATIENT
Start: 2023-07-10

## 2023-07-17 RX ORDER — VALSARTAN AND HYDROCHLOROTHIAZIDE 160; 12.5 MG/1; MG/1
TABLET, FILM COATED ORAL
Qty: 30 TABLET | Refills: 5 | Status: SHIPPED | OUTPATIENT
Start: 2023-07-17

## 2023-07-17 NOTE — TELEPHONE ENCOUNTER
Pt is calling stating he is out of this medication he needs this refilled
Irma Medina  Phone: [phone] Fax: [phone]    Susan Ryder [id number] Viktoriya Durant, [address] [facility]  [address] [place] [address] Sameera [address]  Phone: [phone] Fax: [phone]  Medication:   Requested Prescriptions     Pending Prescriptions Disp Refills    valsartan-hydroCHLOROthiazide (DIOVAN-HCT) 160-12.5 MG per tablet [Pharmacy Med Name: Aissatou Marrero 160-12.5 MG TAB] 30 tablet 5     Sig: TAKE ONE TABLET BY MOUTH DAILY       Last Filled:      Patient Phone Number: [phone] (home)     Last appt: 9/7/2022   Next appt: 7/19/2023    Last BMP:   Lab Results   Component Value Date/Time     09/23/2022 09:16 AM    K 4.2 09/23/2022 09:16 AM     09/23/2022 09:16 AM    CO2 27 09/23/2022 09:16 AM    ANIONGAP 11 09/23/2022 09:16 AM    GLUCOSE 95 09/23/2022 09:16 AM    BUN 20 09/23/2022 09:16 AM    CREATININE 1.1 09/23/2022 09:16 AM    LABGLOM >60 09/23/2022 09:16 AM    GFRAA >60 09/23/2022 09:16 AM    CALCIUM 9.5 09/23/2022 09:16 AM      Last CMP:   Lab Results   Component Value Date/Time     09/23/2022 09:16 AM    K 4.2 09/23/2022 09:16 AM     09/23/2022 09:16 AM    CO2 27 09/23/2022 09:16 AM    ANIONGAP 11 09/23/2022 09:16 AM    GLUCOSE 95 09/23/2022 09:16 AM    BUN 20 09/23/2022 09:16 AM    CREATININE 1.1 09/23/2022 09:16 AM    LABGLOM >60 09/23/2022 09:16 AM    GFRAA >60 09/23/2022 09:16 AM    PROT 6.8 09/23/2022 09:16 AM    LABALBU 4.4 09/23/2022 09:16 AM    AGRATIO 1.8 09/23/2022 09:16 AM    BILITOT 1.1 09/23/2022 09:16 AM    ALKPHOS 98 09/23/2022 09:16 AM    ALT 19 09/23/2022 09:16 AM    AST 22 09/23/2022 09:16 AM     Last Renal Function:   Lab Results   Component Value Date/Time     09/23/2022 09:16 AM    K 4.2 09/23/2022 09:16 AM     09/23/2022 09:16 AM    CO2 27 09/23/2022 09:16 AM    GLUCOSE 95 09/23/2022 09:16 AM    BUN 20 09/23/2022 09:16 AM

## 2023-07-19 ENCOUNTER — OFFICE VISIT (OUTPATIENT)
Dept: PRIMARY CARE CLINIC | Age: 68
End: 2023-07-19

## 2023-07-19 ENCOUNTER — HOSPITAL ENCOUNTER (OUTPATIENT)
Dept: GENERAL RADIOLOGY | Age: 68
Discharge: HOME | End: 2023-07-19
Payer: COMMERCIAL

## 2023-07-19 VITALS
DIASTOLIC BLOOD PRESSURE: 74 MMHG | BODY MASS INDEX: 26.99 KG/M2 | OXYGEN SATURATION: 98 % | HEART RATE: 64 BPM | TEMPERATURE: 97.6 F | SYSTOLIC BLOOD PRESSURE: 122 MMHG | WEIGHT: 199 LBS

## 2023-07-19 DIAGNOSIS — M79.671 RIGHT FOOT PAIN: ICD-10-CM

## 2023-07-19 DIAGNOSIS — M79.671 RIGHT FOOT PAIN: Primary | ICD-10-CM

## 2023-07-19 PROCEDURE — 73630 X-RAY EXAM OF FOOT: CPT

## 2023-07-19 SDOH — ECONOMIC STABILITY: FOOD INSECURITY: WITHIN THE PAST 12 MONTHS, YOU WORRIED THAT YOUR FOOD WOULD RUN OUT BEFORE YOU GOT MONEY TO BUY MORE.: NEVER TRUE

## 2023-07-19 SDOH — ECONOMIC STABILITY: HOUSING INSECURITY
IN THE LAST 12 MONTHS, WAS THERE A TIME WHEN YOU DID NOT HAVE A STEADY PLACE TO SLEEP OR SLEPT IN A SHELTER (INCLUDING NOW)?: NO

## 2023-07-19 SDOH — ECONOMIC STABILITY: INCOME INSECURITY: HOW HARD IS IT FOR YOU TO PAY FOR THE VERY BASICS LIKE FOOD, HOUSING, MEDICAL CARE, AND HEATING?: NOT HARD AT ALL

## 2023-07-19 SDOH — ECONOMIC STABILITY: FOOD INSECURITY: WITHIN THE PAST 12 MONTHS, THE FOOD YOU BOUGHT JUST DIDN'T LAST AND YOU DIDN'T HAVE MONEY TO GET MORE.: NEVER TRUE

## 2023-07-19 ASSESSMENT — PATIENT HEALTH QUESTIONNAIRE - PHQ9
2. FEELING DOWN, DEPRESSED OR HOPELESS: 0
SUM OF ALL RESPONSES TO PHQ QUESTIONS 1-9: 0
1. LITTLE INTEREST OR PLEASURE IN DOING THINGS: 0
2. FEELING DOWN, DEPRESSED OR HOPELESS: NOT AT ALL
SUM OF ALL RESPONSES TO PHQ QUESTIONS 1-9: 0
SUM OF ALL RESPONSES TO PHQ9 QUESTIONS 1 & 2: 0
SUM OF ALL RESPONSES TO PHQ QUESTIONS 1-9: 0
1. LITTLE INTEREST OR PLEASURE IN DOING THINGS: NOT AT ALL
SUM OF ALL RESPONSES TO PHQ QUESTIONS 1-9: 0

## 2023-07-19 NOTE — PROGRESS NOTES
Franklin Salazar (:  1955) is a 69 y.o. male,Established patient, here for evaluation of the following chief complaint(s): Foot Pain (Right foot pain , bone spire )         ASSESSMENT/PLAN:  1. Right foot pain  -     XR FOOT RIGHT (MIN 3 VIEWS); Future  NSAIDs, ice  Bohme padding for right lateral MTP  Refrain from high-impact exercises for a little while until resolved      Return if symptoms worsen or fail to improve. Subjective   SUBJECTIVE/OBJECTIVE:  HPI    Years of right foot pain, he is a runner, and usually goes away but does not seem to be going away now, pain on the right pinky toe side    Does Chi running, has been running on the sides of his feet. Review of Systems   All other systems reviewed and are negative. Objective   Physical Exam  Vitals reviewed. Constitutional:       General: He is not in acute distress. Appearance: Normal appearance. He is not ill-appearing. HENT:      Head: Normocephalic and atraumatic. Right Ear: External ear normal.      Left Ear: External ear normal.   Eyes:      General: No scleral icterus. Right eye: No discharge. Left eye: No discharge. Extraocular Movements: Extraocular movements intact. Conjunctiva/sclera: Conjunctivae normal.   Pulmonary:      Effort: Pulmonary effort is normal. No respiratory distress. Musculoskeletal:      Cervical back: Neck supple. Comments: Right bilateral MTP prominent and tender, slight erythema   Skin:     Coloration: Skin is not jaundiced. Findings: No lesion or rash. Neurological:      Mental Status: He is alert. Cranial Nerves: No cranial nerve deficit. Coordination: Coordination normal.   Psychiatric:         Mood and Affect: Mood normal.                An electronic signature was used to authenticate this note.     --Vadim Caban MD

## 2023-07-21 DIAGNOSIS — M79.671 RIGHT FOOT PAIN: Primary | ICD-10-CM

## 2023-08-25 ENCOUNTER — TELEPHONE (OUTPATIENT)
Dept: PRIMARY CARE CLINIC | Age: 68
End: 2023-08-25

## 2023-08-25 ENCOUNTER — HOSPITAL ENCOUNTER (OUTPATIENT)
Age: 68
Discharge: HOME | End: 2023-08-25
Payer: COMMERCIAL

## 2023-08-25 ENCOUNTER — HOSPITAL ENCOUNTER (OUTPATIENT)
Dept: CT IMAGING | Age: 68
Discharge: HOME | End: 2023-08-25
Payer: COMMERCIAL

## 2023-08-25 DIAGNOSIS — Z00.00 ANNUAL PHYSICAL EXAM: Primary | ICD-10-CM

## 2023-08-25 DIAGNOSIS — Z00.00 ANNUAL PHYSICAL EXAM: ICD-10-CM

## 2023-08-25 DIAGNOSIS — M79.671 RIGHT FOOT PAIN: ICD-10-CM

## 2023-08-25 LAB
ALBUMIN SERPL-MCNC: 4.2 G/DL (ref 3.4–5)
ALBUMIN/GLOB SERPL: 1.5 {RATIO} (ref 1.1–2.2)
ALP SERPL-CCNC: 86 U/L (ref 40–129)
ALT SERPL-CCNC: 22 U/L (ref 10–40)
ANION GAP SERPL CALCULATED.3IONS-SCNC: 7 MMOL/L (ref 3–16)
AST SERPL-CCNC: 24 U/L (ref 15–37)
BASOPHILS # BLD: 0.1 K/UL (ref 0–0.2)
BASOPHILS NFR BLD: 0.9 %
BILIRUB SERPL-MCNC: 1.3 MG/DL (ref 0–1)
BUN SERPL-MCNC: 14 MG/DL (ref 7–20)
CALCIUM SERPL-MCNC: 9.4 MG/DL (ref 8.3–10.6)
CHLORIDE SERPL-SCNC: 106 MMOL/L (ref 99–110)
CHOLEST SERPL-MCNC: 180 MG/DL (ref 0–199)
CO2 SERPL-SCNC: 28 MMOL/L (ref 21–32)
CREAT SERPL-MCNC: 0.8 MG/DL (ref 0.8–1.3)
DEPRECATED RDW RBC AUTO: 12.4 % (ref 12.4–15.4)
EOSINOPHIL # BLD: 0.2 K/UL (ref 0–0.6)
EOSINOPHIL NFR BLD: 3 %
GFR SERPLBLD CREATININE-BSD FMLA CKD-EPI: >60 ML/MIN/{1.73_M2}
GLUCOSE SERPL-MCNC: 97 MG/DL (ref 70–99)
HCT VFR BLD AUTO: 43.1 % (ref 40.5–52.5)
HDLC SERPL-MCNC: 54 MG/DL (ref 40–60)
HGB BLD-MCNC: 14.8 G/DL (ref 13.5–17.5)
LDLC SERPL CALC-MCNC: 109 MG/DL
LYMPHOCYTES # BLD: 2.2 K/UL (ref 1–5.1)
LYMPHOCYTES NFR BLD: 34.8 %
MCH RBC QN AUTO: 33.8 PG (ref 26–34)
MCHC RBC AUTO-ENTMCNC: 34.2 G/DL (ref 31–36)
MCV RBC AUTO: 98.7 FL (ref 80–100)
MONOCYTES # BLD: 0.7 K/UL (ref 0–1.3)
MONOCYTES NFR BLD: 11.1 %
NEUTROPHILS # BLD: 3.2 K/UL (ref 1.7–7.7)
NEUTROPHILS NFR BLD: 50.2 %
PLATELET # BLD AUTO: 181 K/UL (ref 135–450)
PMV BLD AUTO: 9.3 FL (ref 5–10.5)
POTASSIUM SERPL-SCNC: 4.8 MMOL/L (ref 3.5–5.1)
PROT SERPL-MCNC: 7 G/DL (ref 6.4–8.2)
RBC # BLD AUTO: 4.37 M/UL (ref 4.2–5.9)
SODIUM SERPL-SCNC: 141 MMOL/L (ref 136–145)
TRIGL SERPL-MCNC: 85 MG/DL (ref 0–150)
VLDLC SERPL CALC-MCNC: 17 MG/DL
WBC # BLD AUTO: 6.4 K/UL (ref 4–11)

## 2023-08-25 PROCEDURE — 73700 CT LOWER EXTREMITY W/O DYE: CPT

## 2023-08-25 PROCEDURE — 80061 LIPID PANEL: CPT

## 2023-08-25 PROCEDURE — 85025 COMPLETE CBC W/AUTO DIFF WBC: CPT

## 2023-08-25 PROCEDURE — 36415 COLL VENOUS BLD VENIPUNCTURE: CPT

## 2023-08-25 PROCEDURE — 80053 COMPREHEN METABOLIC PANEL: CPT

## 2023-08-25 NOTE — TELEPHONE ENCOUNTER
Patient requested blood work for prior to appointment. Blood work that was ordered for previous physical was ordered again since physician was not here to discuss.

## 2023-09-05 SDOH — HEALTH STABILITY: PHYSICAL HEALTH: ON AVERAGE, HOW MANY DAYS PER WEEK DO YOU ENGAGE IN MODERATE TO STRENUOUS EXERCISE (LIKE A BRISK WALK)?: 7 DAYS

## 2023-09-05 SDOH — HEALTH STABILITY: PHYSICAL HEALTH: ON AVERAGE, HOW MANY MINUTES DO YOU ENGAGE IN EXERCISE AT THIS LEVEL?: 90 MIN

## 2023-09-05 ASSESSMENT — LIFESTYLE VARIABLES
HOW OFTEN DO YOU HAVE A DRINK CONTAINING ALCOHOL: NEVER
HOW OFTEN DO YOU HAVE SIX OR MORE DRINKS ON ONE OCCASION: 1
HOW MANY STANDARD DRINKS CONTAINING ALCOHOL DO YOU HAVE ON A TYPICAL DAY: PATIENT DOES NOT DRINK
HOW MANY STANDARD DRINKS CONTAINING ALCOHOL DO YOU HAVE ON A TYPICAL DAY: 0
HOW OFTEN DO YOU HAVE A DRINK CONTAINING ALCOHOL: 1

## 2023-09-05 ASSESSMENT — PATIENT HEALTH QUESTIONNAIRE - PHQ9
SUM OF ALL RESPONSES TO PHQ QUESTIONS 1-9: 0
SUM OF ALL RESPONSES TO PHQ QUESTIONS 1-9: 0
2. FEELING DOWN, DEPRESSED OR HOPELESS: 0
1. LITTLE INTEREST OR PLEASURE IN DOING THINGS: 0
SUM OF ALL RESPONSES TO PHQ QUESTIONS 1-9: 0
SUM OF ALL RESPONSES TO PHQ QUESTIONS 1-9: 0
SUM OF ALL RESPONSES TO PHQ9 QUESTIONS 1 & 2: 0

## 2023-09-06 ENCOUNTER — OFFICE VISIT (OUTPATIENT)
Dept: PRIMARY CARE CLINIC | Age: 68
End: 2023-09-06

## 2023-09-06 VITALS
HEIGHT: 72 IN | WEIGHT: 197 LBS | HEART RATE: 57 BPM | DIASTOLIC BLOOD PRESSURE: 82 MMHG | BODY MASS INDEX: 26.68 KG/M2 | SYSTOLIC BLOOD PRESSURE: 120 MMHG | OXYGEN SATURATION: 99 % | RESPIRATION RATE: 13 BRPM | TEMPERATURE: 97.3 F

## 2023-09-06 DIAGNOSIS — Z00.00 INITIAL MEDICARE ANNUAL WELLNESS VISIT: Primary | ICD-10-CM

## 2023-09-06 DIAGNOSIS — C61 MALIGNANT NEOPLASM OF PROSTATE (HCC): ICD-10-CM

## 2023-09-06 DIAGNOSIS — D23.5 DERMOID CYST OF SKIN OF BACK: ICD-10-CM

## 2023-11-08 ENCOUNTER — OFFICE VISIT (OUTPATIENT)
Dept: SURGERY | Age: 68
End: 2023-11-08
Payer: COMMERCIAL

## 2023-11-08 VITALS
OXYGEN SATURATION: 98 % | BODY MASS INDEX: 27.4 KG/M2 | SYSTOLIC BLOOD PRESSURE: 158 MMHG | DIASTOLIC BLOOD PRESSURE: 75 MMHG | WEIGHT: 202 LBS | HEART RATE: 60 BPM | TEMPERATURE: 98.1 F

## 2023-11-08 DIAGNOSIS — R22.2 MASS OF SKIN OF BACK: Primary | ICD-10-CM

## 2023-11-08 PROCEDURE — 3078F DIAST BP <80 MM HG: CPT

## 2023-11-08 PROCEDURE — G8417 CALC BMI ABV UP PARAM F/U: HCPCS

## 2023-11-08 PROCEDURE — 99204 OFFICE O/P NEW MOD 45 MIN: CPT

## 2023-11-08 PROCEDURE — 3017F COLORECTAL CA SCREEN DOC REV: CPT

## 2023-11-08 PROCEDURE — G8428 CUR MEDS NOT DOCUMENT: HCPCS

## 2023-11-08 PROCEDURE — G8484 FLU IMMUNIZE NO ADMIN: HCPCS

## 2023-11-08 PROCEDURE — 3077F SYST BP >= 140 MM HG: CPT

## 2023-11-08 PROCEDURE — 1036F TOBACCO NON-USER: CPT

## 2023-11-08 PROCEDURE — 1123F ACP DISCUSS/DSCN MKR DOCD: CPT

## 2023-11-08 NOTE — PROGRESS NOTES
none    IMP: 67 y.o.male with back lesion. PLAN: Will submit to insurance and work to schedule under local at [employer]. A discussion regarding surgical options including: excision of back lesion was performed with the patient. The pathophysiology of back lesion was also elucidated specifying need for resection, observation, & margins. Clinical photos were obtained. Additionally, discussion regarding the risks including, but not limited to: bleeding (potentially requiring transfusion or reoperation), infection, seroma, reoperation, poor cosmetic outcome, scarring,  revisional surgery, diminished sensation, VTE (DVT/PE), and death was performed. All questions were answered in a satisfactory manner.      Ken Rashid, APRN - [facility] Reconstructive Surgery  [phone]  11/08/23

## 2023-11-10 ENCOUNTER — TELEPHONE (OUTPATIENT)
Dept: SURGERY | Age: 68
End: 2023-11-10

## 2023-11-10 NOTE — TELEPHONE ENCOUNTER
The patient was in the office to see Silvia 11-8-2023.    PLAN: Will submit to insurance and work to schedule under [facility].      I received a surgery letter.     I submitted clinicals to [facility] today via the provider portal. The clinicals that I submitted are saved on my computer.     Pending Authorization # [id number]    I lmom for the patient at the home number listed to provide an insurance update.     I will leave this phone note open.

## 2023-11-10 NOTE — TELEPHONE ENCOUNTER
The patient returned the call mentioned below. He wanted to make sure that the request was both of his [facility] insurance carriers.    Please call: [phone]

## 2023-11-10 NOTE — TELEPHONE ENCOUNTER
I returned the patients call mentioned below. The patient is aware that I submit to the primary insurance only, since we are not billing, but trying to obtain pre certification.     I will leave this phone note open.

## 2023-11-21 NOTE — TELEPHONE ENCOUNTER
I received a letter from [facility] dated 11-. I will scan the letter into Epic under the media tab.     APPROVED  Authorization # [id number]  Regi Raines  CPT Code 08723, 74080  Date Range 12-1-2023 to 2-    I lmom for the patient at the home number listed. I requested a call back to discuss insurance and surgery scheduling.    I will leave this phone note open.

## 2023-11-27 NOTE — TELEPHONE ENCOUNTER
The patient returned the call mentioned below. His insurance will be changing as of 1/1/2024. He is aware that the authorization will have to be resubmitted.     Please call: [phone]

## 2023-11-28 NOTE — TELEPHONE ENCOUNTER
I lmom for the patient at the home number listed. I requested a call back to discuss insurance and surgery scheduling.     I will leave this phone note open.

## 2024-01-02 NOTE — TELEPHONE ENCOUNTER
I lmom for the patient at the home number listed. I requested a call back to discuss his new insurance.      I will leave this phone note open.

## 2024-01-10 RX ORDER — VALSARTAN AND HYDROCHLOROTHIAZIDE 160; 12.5 MG/1; MG/1
1 TABLET, FILM COATED ORAL DAILY
Qty: 30 TABLET | Refills: 5 | Status: SHIPPED | OUTPATIENT
Start: 2024-01-10

## 2024-01-10 NOTE — TELEPHONE ENCOUNTER
Medication:   Requested Prescriptions     Pending Prescriptions Disp Refills    valsartan-hydroCHLOROthiazide (DIOVAN-HCT) 160-12.5 MG per tablet [Pharmacy Med Name: VALSARTAN-HCTZ 160-12.5 MG TAB] 30 tablet 5     Sig: TAKE 1 TABLET BY MOUTH DAILY     Last Filled:  07/17/2023    Last appt: 9/6/2023   Next appt: Visit date not found    Last OARRS:        No data to display

## 2024-02-15 ENCOUNTER — PREP FOR PROCEDURE (OUTPATIENT)
Dept: SURGERY | Age: 69
End: 2024-02-15

## 2024-02-15 DIAGNOSIS — L98.9 SKIN LESION OF BACK: ICD-10-CM

## 2024-03-07 NOTE — PROGRESS NOTES
Gómez Poonthaoier    Age 68 y.o.    male    1955    ROSSI [id number]    3/15/2024  Arrival Time_____________  OR Time____________60 Min     Procedure(s):  EXCISION OF BACK LESION, POSSIBLE ADJACENT TISSUE TRANSFER                      General    Surgeon(s):  Angelia Guidrycarterkelly, MD       Phone [phone] (home)     [facility]  Date  Info Source  Home  Cell         Work  _____________________________________________________________________  _____________________________________________________________________  _____________________________________________________________________  _____________________________________________________________________  _____________________________________________________________________    PCP _____________________________ Phone_________________     H&P  ________________  Bringing      Chart              Epic      DOS      Called________  EKG ________________   Bringing      Chart              Epic      DOS      Called________  LABS________________   Bringing     Chart              Epic      DOS      Called________  Cardiac Clearance ______ Bringing      Chart              Epic      DOS      Called________  Pulmonary Clearance____ Bringing      Chart              Epic      DOS      Called________    Cardiologist________________________ Phone___________________________  Pulmonologist_______________________Phone___________________________    ? Advance Directives   ? Protestant concerns / Waiver on Chart            PAT Communications________________  ? Pre-op Instructions Given /Understood          _________________________________  ? Directions to Surgery Center                          _________________________________  ? Transportation Home_______________      __________________________________  ? Crutches/Walker__________________        __________________________________    Orders: Hard copy/ EPIC                 Transcribed/ EPIC              _______Wt.    ________Pharmacy                          _______SCD                      ______ANDREA PORTILLO    COVID + _______DATE    ___OK per Anesthesia   ____OK per Surgeon

## 2024-03-13 NOTE — PROGRESS NOTES
Date and time of surgery :  3/15/24 at 1330            Arrival Time:  1130     Bring Picture ID and insurance card.  Please wear simple, loose fitting clothing to the hospital.   Do not bring valuables (money, credit cards, checkbooks, etc.)   DO NOT wear any jewelry or piercings on day of surgery.  All body piercing jewelry must be removed.  If you have dentures, they will be removed before going to the OR; we will provide you a container.  If you wear contact lenses or glasses, they will be removed; please bring a case for them.  Shower the evening before or morning of surgery with antibacterial soap.  Aspirin, Ibuprofen, Advil, Naproxen, Vitamin E and other Anti-inflammatory products and supplements should be stopped for 5 -7days before surgery or as directed by your physician.  Do not smoke or drink any alcoholic beverages 24 hours prior to surgery.  This includes NA Beer. Refrain from the usage of any recreational drugs, including non-prescribed prescription drugs.   To help prevent infection, change your sheets the night before surgery.   If you  have a Living Will and Durable Power of  for Healthcare, please bring in a copy.  Notify your Surgeon if you develop any illness between now and time of surgery. Cough, cold, fever, sore throat, nausea, vomiting, etc.  Please notify your surgeon if you experience dizziness, shortness of breath or blurred vision between now & the time of your surgery  To provide excellent care visitors will be limited to two per room at any given time. No visitors under the age of 14.  If you use oxygen and have a portable tank please bring it with you the DOS  For your convenience [facility] has a pharmacy on site to fill your prescriptions.     *Please call pre admission testing if you have any further questions             Olu      [phone]                            Address: [address] [place] [address]     When you pull into the hospital and are looking at the  main hospital entrance, turn right.   We are a tan building to the right of the main entrance.   [facility] over the door.  .                                                          Revision History

## 2024-03-15 ENCOUNTER — HOSPITAL ENCOUNTER (OUTPATIENT)
Age: 69
Setting detail: OUTPATIENT SURGERY
Discharge: HOME | End: 2024-03-15
Attending: SURGERY | Admitting: SURGERY
Payer: COMMERCIAL

## 2024-03-15 VITALS
TEMPERATURE: 97.8 F | HEART RATE: 51 BPM | OXYGEN SATURATION: 100 % | WEIGHT: 190 LBS | DIASTOLIC BLOOD PRESSURE: 72 MMHG | BODY MASS INDEX: 25.73 KG/M2 | SYSTOLIC BLOOD PRESSURE: 152 MMHG | RESPIRATION RATE: 16 BRPM | HEIGHT: 72 IN

## 2024-03-15 DIAGNOSIS — L98.9 SKIN LESION OF BACK: ICD-10-CM

## 2024-03-15 PROCEDURE — 88304 TISSUE EXAM BY PATHOLOGIST: CPT

## 2024-03-15 PROCEDURE — 13101 CMPLX RPR TRUNK 2.6-7.5 CM: CPT | Performed by: SURGERY

## 2024-03-15 PROCEDURE — 7100000010 HC PHASE II RECOVERY - FIRST 15 MIN: Performed by: SURGERY

## 2024-03-15 PROCEDURE — 11404 EXC TR-EXT B9+MARG 3.1-4 CM: CPT | Performed by: SURGERY

## 2024-03-15 PROCEDURE — 3600000013 HC SURGERY LEVEL 3 ADDTL 15MIN: Performed by: SURGERY

## 2024-03-15 PROCEDURE — 3600000003 HC SURGERY LEVEL 3 BASE: Performed by: SURGERY

## 2024-03-15 PROCEDURE — 7100000011 HC PHASE II RECOVERY - ADDTL 15 MIN: Performed by: SURGERY

## 2024-03-15 PROCEDURE — 2709999900 HC NON-CHARGEABLE SUPPLY: Performed by: SURGERY

## 2024-03-15 PROCEDURE — 2500000003 HC RX 250 WO HCPCS: Performed by: SURGERY

## 2024-03-15 PROCEDURE — 6360000002 HC RX W HCPCS: Performed by: SURGERY

## 2024-03-15 RX ORDER — SODIUM CHLORIDE, SODIUM LACTATE, POTASSIUM CHLORIDE, CALCIUM CHLORIDE 600; 310; 30; 20 MG/100ML; MG/100ML; MG/100ML; MG/100ML
INJECTION, SOLUTION INTRAVENOUS CONTINUOUS
Status: DISCONTINUED | OUTPATIENT
Start: 2024-03-15 | End: 2024-03-15 | Stop reason: HOSPADM

## 2024-03-15 RX ORDER — SODIUM CHLORIDE 0.9 % (FLUSH) 0.9 %
5-40 SYRINGE (ML) INJECTION PRN
Status: DISCONTINUED | OUTPATIENT
Start: 2024-03-15 | End: 2024-03-15 | Stop reason: HOSPADM

## 2024-03-15 RX ORDER — LIDOCAINE HYDROCHLORIDE 10 MG/ML
1 INJECTION, SOLUTION EPIDURAL; INFILTRATION; INTRACAUDAL; PERINEURAL
Status: DISCONTINUED | OUTPATIENT
Start: 2024-03-15 | End: 2024-03-15 | Stop reason: HOSPADM

## 2024-03-15 RX ORDER — MIDAZOLAM HYDROCHLORIDE 1 MG/ML
2 INJECTION INTRAMUSCULAR; INTRAVENOUS
Status: DISCONTINUED | OUTPATIENT
Start: 2024-03-15 | End: 2024-03-15 | Stop reason: HOSPADM

## 2024-03-15 RX ORDER — SODIUM CHLORIDE 9 MG/ML
INJECTION, SOLUTION INTRAVENOUS PRN
Status: DISCONTINUED | OUTPATIENT
Start: 2024-03-15 | End: 2024-03-15 | Stop reason: HOSPADM

## 2024-03-15 RX ORDER — SODIUM CHLORIDE 0.9 % (FLUSH) 0.9 %
5-40 SYRINGE (ML) INJECTION EVERY 12 HOURS SCHEDULED
Status: DISCONTINUED | OUTPATIENT
Start: 2024-03-15 | End: 2024-03-15 | Stop reason: HOSPADM

## 2024-03-15 ASSESSMENT — PAIN - FUNCTIONAL ASSESSMENT: PAIN_FUNCTIONAL_ASSESSMENT: 0-10

## 2024-03-15 NOTE — PROGRESS NOTES
Pt alert and oriented x4. Pt tolerated fluids and pretzels. Patient meets criteria for discharge per policy. Discharge instructions given to patient and family, verbalized understanding. Pt discharged home in stable condition.

## 2024-03-15 NOTE — PROGRESS NOTES
Patient arrived to PACU bay 7, phase one initiated. Placed on bedside monitor, VSS. Report obtained from OR RN. Pt stated no pain, no nausea. No emesis. Pt is alert. Side rails in place.

## 2024-03-15 NOTE — OP NOTE
[facility] PLASTIC & RECONSTRUCTIVE SURGERY     OPERATIVE DICTATION    NAME: Gómez Peace   MRN: [id number]  DATE: 3/15/2024     AGE: 68 y.o.    LOCATION: [facility]    PREOPERATIVE DIAGNOSIS:  Back lesion     POSTOPERATIVE DIAGNOSIS:  Same.     OPERATION PERFORMED: 1) Excision of paramedian back lesion (3.6 x 2.5 cm)      2) Complex closure of the back (4 cm)         SURGEON:  Thu Guidry MD     ANESTHESIA:  Local     ESTIMATED BLOOD LOSS:  Minimal     DRAINS:  None     SPECIMENS: Cyst     OPERATIVE INDICATIONS:  This is a 68 y.o. male who presented to the office in consultation for a growing back lesion with discomfort.  The patient desired excision and a thorough discussion regarding the risks, benefits, alternatives, outcomes, and personnel involved was performed with the patient.  After all questions were answered to the patient's satisfaction, they agreed to proceed.    OPERATIVE PROCEDURE:  The patient was brought to the operating room and placed in the prone position and padded appropriately. The patient was prepped and draped in the usual sterile manner.  A time-out was performed confirming the patient and the procedure to be performed.  The operation commenced by infiltration of local using a 50:50 mixture of 1% lidocaine with epinephrine and 0.5% marcaine plain. An excision was then performed removing the central punctum. The skin flaps were dissected free of the cyst and then the lesion was removed off the fascia of the back. Hemostasis was obtained with electrocautery.     The wound was then closed by widely undermining the periphery to allow for a tension free closure.  The skin was then closed in layers using 3-0 Monocryl sutures followed by a sterile dressing was then applied. There were no immediate complications and the patient tolerated the procedure well. At the end of the case, all counts were correct.    THU GUIDRY MD

## 2024-03-15 NOTE — H&P
[facility]     CC: Skin lesion     Referring Physician: Padmini De Leon MD     HPI: This is an 67 y.o.male with a PMHx as delineated below who presents to clinic in consultation for back mass. The patient noticed the lesion for approximately many years. He states it has grown in size. He wishes to have this removed surgically. Plastic surgery was consulted for evaluation and treatment.    Since his last evaluation with Silvia Koehler NP, he notes no changes in his medical history.      PMHx:   Past Medical History        Past Medical History:   Diagnosis Date    Prostate cancer (HCC) 2013     brachytherapy         PSHx:   Past Surgical History         Past Surgical History:   Procedure Laterality Date    CATARACT REMOVAL WITH IMPLANT Right 2013    INGUINAL HERNIA REPAIR Left 1971     injury to seminal vesicles, rendered him infertile         Allergy:        Allergies   Allergen Reactions    No Known Allergies           SHx:   Social History               Socioeconomic History    Marital status:        Spouse name: Not on file    Number of children: 3    Years of education: Not on file    Highest education level: Not on file   Occupational History    Occupation: data,    Tobacco Use    Smoking status: Never    Smokeless tobacco: Never   Substance and Sexual Activity    Alcohol use: Not Currently    Drug use: Never    Sexual activity: Not on file   Other Topics Concern    Not on file   Social History Narrative     Runs 25 mi/week      Social Determinants of Health           Financial Resource Strain: Low Risk  (7/19/2023)     Overall Financial Resource Strain (CARDIA)      Difficulty of Paying Living Expenses: Not hard at all   Food Insecurity: No Food Insecurity (7/19/2023)     Hunger Vital Sign      Worried About Running Out of Food in the Last Year: Never true      Ran Out of Food in the Last Year: Never true   Transportation Needs: Unknown (7/19/2023)     PRAPARE

## 2024-03-15 NOTE — DISCHARGE INSTRUCTIONS
[facility]    Saturnino Guidry MD  [address] ([address])  [place] [id number] [phone]    Post-op Instructions  ___________________________________________    Skin Lesion Removal Instructions    The following instructions will help you know what to expect in the days following your surgery. Do not, however, hesitate to call if you have questions or concerns.    Activities   You may resume your regular activity. However,  avoid vigorous activity until seen after your 1 week visit.    Diet   Resume your preoperative diet as tolerated. Increasing the amount of protein and eliminating unhealthy fats can improve your wound healing and lead to an improved outcome.     Special Instructions / Medications  Follow these instructions for another 2 weeks AFTER your surgery     There is absolutely NO driving while on pain medication or Valium®     Do NOT take any of the following products:   Aspirin/low-dose aspirin   Ibuprofen (Advil®, Motrin®)   Naprosyn or Naproxen (Aleve®)   Vitamin E (even small amounts in multiple vitamins)   Herbals or homeopathic medicines or green tea   Growth hormone   Diet pills (Meridia®, Metabolife®, etc)      TYLENOL-containing products (acetaminophen) are safe to take. (If you are not sure what products to take or avoid, call the office.)    Pain Control   You may be prescribed a narcotic pain medication for the initial postoperative period.  Take only as instructed as needed for pain.   As mentioned above, you can take Tylenol for your pain control, however some pain medication may have Tylenol included in the ingredients (e.g. Percocet®, Vicodin®). Make sure that you do not take more than 4 grams of Tylenol in a single day. If you have any questions, you can contact the office.    Wound Care   Keep your bandage clean and dry for 24 hours (if you have one)   After 24 hours, the bandage may be removed and you can shower.   If you have flesh colored

## 2024-03-18 ENCOUNTER — TELEPHONE (OUTPATIENT)
Dept: SURGERY | Age: 69
End: 2024-03-18

## 2024-03-18 NOTE — TELEPHONE ENCOUNTER
Post Op Call    Patient is 3 days out from  Excision of paramedian back lesion. Complex closure of the back surgery.    Patient did not answer phone , left detailed message including date and time of post op visit.   Patient has post-op appointment scheduled for: 03/25/2024

## 2024-03-25 ENCOUNTER — OFFICE VISIT (OUTPATIENT)
Dept: SURGERY | Age: 69
End: 2024-03-25

## 2024-03-25 VITALS
HEART RATE: 65 BPM | BODY MASS INDEX: 25.77 KG/M2 | DIASTOLIC BLOOD PRESSURE: 87 MMHG | WEIGHT: 190 LBS | SYSTOLIC BLOOD PRESSURE: 159 MMHG | OXYGEN SATURATION: 98 % | TEMPERATURE: 97.9 F

## 2024-03-25 DIAGNOSIS — Z09 POSTOP CHECK: Primary | ICD-10-CM

## 2024-03-25 PROCEDURE — 99024 POSTOP FOLLOW-UP VISIT: CPT

## 2024-03-25 NOTE — PROGRESS NOTES
[facility]    PROCEDURE:  1) Excision of paramedian back lesion (3.6 x 2.5 cm)  2) Complex closure of the back (4 cm)  DATE: 3/15/24    Gómez Peace has been recovering well since her procedure. Pain has been well controlled without pain medications.     EXAM    BP (!) 159/87   Pulse 65   Temp 97.9 °F (36.6 °C)   Wt 86.2 kg (190 lb)   SpO2 98%   BMI 25.77 kg/m²       GEN: NAD   BACK: Incision site healing appropriately. No hematoma/seroma.     PATHOLOGY: FINAL DIAGNOSIS:     Skin of back, excision:      - Follicular cyst, infundibular type.     IMP: 68 y.o.male s/p Excision of paramedian back lesion with complex closure of the back  PLAN: Overall doing well. Patient is happy with outcome. He will follow up as needed.     Xochitl Koehler, ROBERT - CNP   [facility]  [phone]  03/25/24

## 2024-04-12 ENCOUNTER — APPOINTMENT (RX ONLY)
Dept: URBAN - METROPOLITAN AREA CLINIC 170 | Facility: CLINIC | Age: 69
Setting detail: DERMATOLOGY
End: 2024-04-12

## 2024-04-12 DIAGNOSIS — D18.0 HEMANGIOMA: ICD-10-CM | Status: STABLE

## 2024-04-12 DIAGNOSIS — L82.1 OTHER SEBORRHEIC KERATOSIS: ICD-10-CM | Status: STABLE

## 2024-04-12 DIAGNOSIS — Z85.828 PERSONAL HISTORY OF OTHER MALIGNANT NEOPLASM OF SKIN: ICD-10-CM | Status: STABLE

## 2024-04-12 DIAGNOSIS — L81.4 OTHER MELANIN HYPERPIGMENTATION: ICD-10-CM | Status: STABLE

## 2024-04-12 DIAGNOSIS — L57.0 ACTINIC KERATOSIS: ICD-10-CM | Status: INADEQUATELY CONTROLLED

## 2024-04-12 DIAGNOSIS — D22 MELANOCYTIC NEVI: ICD-10-CM | Status: STABLE

## 2024-04-12 PROBLEM — D22.5 MELANOCYTIC NEVI OF TRUNK: Status: ACTIVE | Noted: 2024-04-12

## 2024-04-12 PROBLEM — D18.01 HEMANGIOMA OF SKIN AND SUBCUTANEOUS TISSUE: Status: ACTIVE | Noted: 2024-04-12

## 2024-04-12 PROCEDURE — 17000 DESTRUCT PREMALG LESION: CPT

## 2024-04-12 PROCEDURE — ? LIQUID NITROGEN

## 2024-04-12 PROCEDURE — ? TREATMENT REGIMEN

## 2024-04-12 PROCEDURE — ? COUNSELING

## 2024-04-12 PROCEDURE — 17003 DESTRUCT PREMALG LES 2-14: CPT

## 2024-04-12 PROCEDURE — ? FULL BODY SKIN EXAM

## 2024-04-12 PROCEDURE — 99203 OFFICE O/P NEW LOW 30 MIN: CPT | Mod: 25

## 2024-04-12 ASSESSMENT — LOCATION DETAILED DESCRIPTION DERM
LOCATION DETAILED: EPIGASTRIC SKIN
LOCATION DETAILED: RIGHT MID-UPPER BACK
LOCATION DETAILED: INFERIOR THORACIC SPINE
LOCATION DETAILED: RIGHT MEDIAL DISTAL PRETIBIAL REGION
LOCATION DETAILED: RIGHT LATERAL ABDOMEN
LOCATION DETAILED: RIGHT PROXIMAL PRETIBIAL REGION
LOCATION DETAILED: LEFT CENTRAL TEMPLE

## 2024-04-12 ASSESSMENT — LOCATION SIMPLE DESCRIPTION DERM
LOCATION SIMPLE: RIGHT PRETIBIAL REGION
LOCATION SIMPLE: RIGHT UPPER BACK
LOCATION SIMPLE: ABDOMEN
LOCATION SIMPLE: LEFT TEMPLE
LOCATION SIMPLE: UPPER BACK

## 2024-04-12 ASSESSMENT — LOCATION ZONE DERM
LOCATION ZONE: LEG
LOCATION ZONE: TRUNK
LOCATION ZONE: FACE

## 2024-05-27 SDOH — HEALTH STABILITY: PHYSICAL HEALTH: ON AVERAGE, HOW MANY DAYS PER WEEK DO YOU ENGAGE IN MODERATE TO STRENUOUS EXERCISE (LIKE A BRISK WALK)?: 6 DAYS

## 2024-05-27 SDOH — HEALTH STABILITY: PHYSICAL HEALTH: ON AVERAGE, HOW MANY MINUTES DO YOU ENGAGE IN EXERCISE AT THIS LEVEL?: 60 MIN

## 2024-05-27 ASSESSMENT — LIFESTYLE VARIABLES
HOW OFTEN DO YOU HAVE SIX OR MORE DRINKS ON ONE OCCASION: 1
HOW MANY STANDARD DRINKS CONTAINING ALCOHOL DO YOU HAVE ON A TYPICAL DAY: PATIENT DOES NOT DRINK
HOW OFTEN DO YOU HAVE A DRINK CONTAINING ALCOHOL: 1
HOW OFTEN DO YOU HAVE A DRINK CONTAINING ALCOHOL: NEVER
HOW MANY STANDARD DRINKS CONTAINING ALCOHOL DO YOU HAVE ON A TYPICAL DAY: 0

## 2024-05-27 ASSESSMENT — PATIENT HEALTH QUESTIONNAIRE - PHQ9
SUM OF ALL RESPONSES TO PHQ QUESTIONS 1-9: 0
SUM OF ALL RESPONSES TO PHQ QUESTIONS 1-9: 0
2. FEELING DOWN, DEPRESSED OR HOPELESS: NOT AT ALL
SUM OF ALL RESPONSES TO PHQ9 QUESTIONS 1 & 2: 0
SUM OF ALL RESPONSES TO PHQ QUESTIONS 1-9: 0
1. LITTLE INTEREST OR PLEASURE IN DOING THINGS: NOT AT ALL
SUM OF ALL RESPONSES TO PHQ QUESTIONS 1-9: 0

## 2024-05-29 ENCOUNTER — OFFICE VISIT (OUTPATIENT)
Dept: PRIMARY CARE CLINIC | Age: 69
End: 2024-05-29

## 2024-05-29 VITALS
SYSTOLIC BLOOD PRESSURE: 128 MMHG | HEIGHT: 72 IN | HEART RATE: 62 BPM | OXYGEN SATURATION: 96 % | WEIGHT: 201.2 LBS | DIASTOLIC BLOOD PRESSURE: 76 MMHG | BODY MASS INDEX: 27.25 KG/M2

## 2024-05-29 DIAGNOSIS — C61 MALIGNANT NEOPLASM OF PROSTATE (HCC): ICD-10-CM

## 2024-05-29 DIAGNOSIS — Z00.00 MEDICARE ANNUAL WELLNESS VISIT, SUBSEQUENT: Primary | ICD-10-CM

## 2024-05-29 DIAGNOSIS — E78.2 MIXED HYPERLIPIDEMIA: ICD-10-CM

## 2024-05-29 DIAGNOSIS — I10 ESSENTIAL HYPERTENSION: Chronic | ICD-10-CM

## 2024-05-29 DIAGNOSIS — Z82.49 FAMILY HISTORY OF EARLY CAD: ICD-10-CM

## 2024-05-29 PROBLEM — D69.2 PURPURA (HCC): Status: ACTIVE | Noted: 2024-05-29

## 2024-05-29 NOTE — PATIENT INSTRUCTIONS
recommended every 1-2 years to screen for glaucoma; cataracts, macular degeneration, and other eye disorders.  A preventive dental visit is recommended every 6 months.  Try to get at least 150 minutes of exercise per week or 10,000 steps per day on a pedometer .  Order or download the FREE \"Exercise & Physical Activity: Your Everyday Guide\" from [employer]. Call 1-598.614.7004 or search [employer] online.  You need 9173-2908 mg of calcium and 7407-5156 IU of vitamin D per day. It is possible to meet your calcium requirement with diet alone, but a vitamin D supplement is usually necessary to meet this goal.  When exposed to the sun, use a sunscreen that protects against both UVA and UVB radiation with an SPF of 30 or greater. Reapply every 2 to 3 hours or after sweating, drying off with a towel, or swimming.  Always wear a seat belt when traveling in a car. Always wear a helmet when riding a bicycle or motorcycle.

## 2024-05-29 NOTE — PROGRESS NOTES
Medicare Annual Wellness Visit    Gómez Peace is here for Medicare AWV    Assessment & Plan   Medicare annual wellness visit, subsequent  General wellness exam. Reviewed chart for past hx and updated today. Counseled on age appropriate health guidance and discussed screening recommendations. Vaccinations reviewed and discussed. All questions answered    Malignant neoplasm of prostate (HCC)  resolved  -     Comprehensive Metabolic Panel; Future  -     Lipid Panel; Future  -     CBC with Auto Differential; Future  -     PSA, Prostatic Specific Antigen (Hel[place] Tasneem); Future  Essential hypertension  Chronic well controlled  Continue current regimen    -     Comprehensive Metabolic Panel; Future  -     Lipid Panel; Future  -     CBC with Auto Differential; Future  Mixed hyperlipidemia  Chronic well controlled  Continue current regimen    -     Comprehensive Metabolic Panel; Future  -     Lipid Panel; Future  -     CBC with Auto Differential; Future  -     CT CARDIAC CALCIUM SCORING; Future  Family history of early CAD  -     CT CARDIAC CALCIUM SCORING; Future  Very strong fam history CAD in parents and siblings    Recommendations for Preventive Services Due: see orders and patient instructions/AVS.  Recommended screening schedule for the next 5-10 years is provided to the patient in written form: see Patient Instructions/AVS.     No follow-ups on file.     Subjective       Strong cardiac history     Patient's complete Health Risk Assessment and screening values have been reviewed and are found in Flowsheets. The following problems were reviewed today and where indicated follow up appointments were made and/or referrals ordered.    Positive Risk Factor Screenings with Interventions:                    Safety:  Do you have any tripping hazards - loose or unsecured carpets or rugs?: (!) Yes  Interventions:  See AVS for additional education material                   Objective   Vitals:    05/29/24 1454   BP: 128/76

## 2024-05-30 PROBLEM — D69.2 PURPURA (HCC): Status: RESOLVED | Noted: 2024-05-29 | Resolved: 2024-05-30

## 2024-06-14 ENCOUNTER — HOSPITAL ENCOUNTER (OUTPATIENT)
Dept: CT IMAGING | Age: 69
Discharge: HOME | End: 2024-06-14
Payer: COMMERCIAL

## 2024-06-14 DIAGNOSIS — Z82.49 FAMILY HISTORY OF EARLY CAD: ICD-10-CM

## 2024-06-14 DIAGNOSIS — E78.2 MIXED HYPERLIPIDEMIA: ICD-10-CM

## 2024-06-14 PROCEDURE — 75571 CT HRT W/O DYE W/CA TEST: CPT

## 2024-06-17 DIAGNOSIS — R93.1 ELEVATED CORONARY ARTERY CALCIUM SCORE: Primary | ICD-10-CM

## 2024-06-20 ENCOUNTER — TELEPHONE (OUTPATIENT)
Dept: PRIMARY CARE CLINIC | Age: 69
End: 2024-06-20

## 2024-06-20 NOTE — TELEPHONE ENCOUNTER
Pt called and stated Dr wanted to put him on meds (he doesn't know what kind)    Pt has not received anything from pharmacy. Pls call pt to discuss.

## 2024-06-27 ENCOUNTER — APPOINTMENT (RX ONLY)
Dept: URBAN - METROPOLITAN AREA CLINIC 170 | Facility: CLINIC | Age: 69
Setting detail: DERMATOLOGY
End: 2024-06-27

## 2024-06-27 DIAGNOSIS — D69.2 OTHER NONTHROMBOCYTOPENIC PURPURA: ICD-10-CM

## 2024-06-27 PROCEDURE — ? COUNSELING

## 2024-06-27 PROCEDURE — 99212 OFFICE O/P EST SF 10 MIN: CPT

## 2024-06-27 ASSESSMENT — LOCATION ZONE DERM: LOCATION ZONE: ARM

## 2024-06-27 ASSESSMENT — LOCATION DETAILED DESCRIPTION DERM: LOCATION DETAILED: LEFT DISTAL DORSAL FOREARM

## 2024-06-27 ASSESSMENT — LOCATION SIMPLE DESCRIPTION DERM: LOCATION SIMPLE: LEFT FOREARM

## 2024-06-27 NOTE — HPI: SKIN LESIONS
How Severe Is Your Skin Lesion?: mild
Have Your Skin Lesions Been Treated?: not been treated
Is This A New Presentation, Or A Follow-Up?: Skin Lesions
Additional History: He states these spots just popped up. He has had something similar pop up in the past but they went away

## 2024-07-05 DIAGNOSIS — I10 ESSENTIAL HYPERTENSION: Chronic | ICD-10-CM

## 2024-07-05 DIAGNOSIS — C61 MALIGNANT NEOPLASM OF PROSTATE (HCC): ICD-10-CM

## 2024-07-05 DIAGNOSIS — E78.2 MIXED HYPERLIPIDEMIA: ICD-10-CM

## 2024-07-05 LAB
ALBUMIN SERPL-MCNC: 4.2 G/DL (ref 3.4–5)
ALBUMIN/GLOB SERPL: 1.6 {RATIO} (ref 1.1–2.2)
ALP SERPL-CCNC: 83 U/L (ref 40–129)
ALT SERPL-CCNC: 22 U/L (ref 10–40)
ANION GAP SERPL CALCULATED.3IONS-SCNC: 13 MMOL/L (ref 3–16)
AST SERPL-CCNC: 25 U/L (ref 15–37)
BASOPHILS # BLD: 0 K/UL (ref 0–0.2)
BASOPHILS NFR BLD: 0.6 %
BILIRUB SERPL-MCNC: 1.3 MG/DL (ref 0–1)
BUN SERPL-MCNC: 18 MG/DL (ref 7–20)
CALCIUM SERPL-MCNC: 9.4 MG/DL (ref 8.3–10.6)
CHLORIDE SERPL-SCNC: 104 MMOL/L (ref 99–110)
CHOLEST SERPL-MCNC: 145 MG/DL (ref 0–199)
CO2 SERPL-SCNC: 25 MMOL/L (ref 21–32)
CREAT SERPL-MCNC: 0.9 MG/DL (ref 0.8–1.3)
DEPRECATED RDW RBC AUTO: 12.7 % (ref 12.4–15.4)
EOSINOPHIL # BLD: 0.1 K/UL (ref 0–0.6)
EOSINOPHIL NFR BLD: 1.6 %
GFR SERPLBLD CREATININE-BSD FMLA CKD-EPI: >90 ML/MIN/{1.73_M2}
GLUCOSE SERPL-MCNC: 84 MG/DL (ref 70–99)
HCT VFR BLD AUTO: 42.2 % (ref 40.5–52.5)
HDLC SERPL-MCNC: 53 MG/DL (ref 40–60)
HGB BLD-MCNC: 14.6 G/DL (ref 13.5–17.5)
LDLC SERPL CALC-MCNC: 68 MG/DL
LYMPHOCYTES # BLD: 2.5 K/UL (ref 1–5.1)
LYMPHOCYTES NFR BLD: 39.3 %
MCH RBC QN AUTO: 34.3 PG (ref 26–34)
MCHC RBC AUTO-ENTMCNC: 34.5 G/DL (ref 31–36)
MCV RBC AUTO: 99.5 FL (ref 80–100)
MONOCYTES # BLD: 0.6 K/UL (ref 0–1.3)
MONOCYTES NFR BLD: 10.1 %
NEUTROPHILS # BLD: 3.1 K/UL (ref 1.7–7.7)
NEUTROPHILS NFR BLD: 48.4 %
PLATELET # BLD AUTO: 192 K/UL (ref 135–450)
PMV BLD AUTO: 9 FL (ref 5–10.5)
POTASSIUM SERPL-SCNC: 4.1 MMOL/L (ref 3.5–5.1)
PROT SERPL-MCNC: 6.9 G/DL (ref 6.4–8.2)
PSA SERPL DL<=0.01 NG/ML-MCNC: 0.02 NG/ML (ref 0–4)
RBC # BLD AUTO: 4.24 M/UL (ref 4.2–5.9)
SODIUM SERPL-SCNC: 142 MMOL/L (ref 136–145)
TRIGL SERPL-MCNC: 121 MG/DL (ref 0–150)
VLDLC SERPL CALC-MCNC: 24 MG/DL
WBC # BLD AUTO: 6.4 K/UL (ref 4–11)

## 2024-07-09 RX ORDER — VALSARTAN AND HYDROCHLOROTHIAZIDE 160; 12.5 MG/1; MG/1
1 TABLET, FILM COATED ORAL DAILY
Qty: 30 TABLET | Refills: 0 | Status: SHIPPED | OUTPATIENT
Start: 2024-07-09

## 2024-07-09 NOTE — TELEPHONE ENCOUNTER
Medication:   Requested Prescriptions     Pending Prescriptions Disp Refills    valsartan-hydroCHLOROthiazide (DIOVAN-HCT) 160-12.5 MG per tablet [Pharmacy Med Name: VALSARTAN-HCTZ 160-12.5 MG TAB] 30 tablet 5     Sig: TAKE 1 TABLET BY MOUTH DAILY     Last Filled:  1.10.24    Last appt: 5/29/2024   Next appt: Visit date not found    Last OARRS:        No data to display

## 2024-08-06 RX ORDER — VALSARTAN AND HYDROCHLOROTHIAZIDE 160; 12.5 MG/1; MG/1
1 TABLET, FILM COATED ORAL DAILY
Qty: 90 TABLET | Refills: 1 | Status: SHIPPED | OUTPATIENT
Start: 2024-08-06

## 2024-08-06 NOTE — TELEPHONE ENCOUNTER
Medication:   Requested Prescriptions     Pending Prescriptions Disp Refills    valsartan-hydroCHLOROthiazide (DIOVAN-HCT) 160-12.5 MG per tablet 30 tablet 0     Sig: Take 1 tablet by mouth daily     Last Filled:  7/9/24    Last appt: 5/29/2024   Next appt: Visit date not found    Last OARRS:        No data to display

## 2024-08-30 NOTE — PROGRESS NOTES
[facility]     Outpatient Cardiology         Patient Name:  óGmez Peace  Requesting Physician: No admitting provider for patient encounter.  Primary Care Physician: Padmini De Leon MD    Reason for Consultation/Chief Complaint:   Chief Complaint   Patient presents with    coronary artery calcification         History of Present Illness:    HPI     Gómez Torrez a 68 y.o. male with PMH of HTN and prostate CA.    Here for evaluation of elevated coronary calcium score.   Ca score 746. Last LDL 68 7/2024 taking Crestor.  No side effects.  HTN- Diovan 160/12.5 qd, well-controlled, no side effects  Very active, runs without any symptoms.  Today daily we discussed further testing given elevated calcium score for his age      PMH  Past Medical History:   Diagnosis Date    Hypertension     Prostate cancer (HCC) 2013    brachytherapy       PSH  Past Surgical History:   Procedure Laterality Date    ABDOMEN SURGERY N/A 3/15/2024    EXCISION OF BACK LESION performed by Polly Guidry MD at [facility] OR    CATARACT REMOVAL WITH IMPLANT Bilateral 2013    COLONOSCOPY      INGUINAL HERNIA REPAIR Left 1971    injury to seminal vesicles, rendered him infertile        Social HIstory  Social History     Tobacco Use    Smoking status: Never    Smokeless tobacco: Never   Vaping Use    Vaping status: Never Used   Substance Use Topics    Alcohol use: Not Currently    Drug use: Never       Family History  Family History   Problem Relation Age of Onset    Cancer Mother     Dementia Mother     Cancer Father     Heart Disease Father         CHF       Allergies   Allergies   Allergen Reactions    No Known Allergies        Medications:     Home Medications:  Were reviewed and are listed in nursing record. and/or listed below    Prior to Admission medications    Medication Sig Start Date End Date Taking? Authorizing Provider   valsartan-hydroCHLOROthiazide (DIOVAN-HCT) 160-12.5 MG per tablet Take 1 tablet by

## 2024-09-03 ENCOUNTER — OFFICE VISIT (OUTPATIENT)
Dept: CARDIOLOGY CLINIC | Age: 69
End: 2024-09-03
Payer: COMMERCIAL

## 2024-09-03 VITALS
BODY MASS INDEX: 27.56 KG/M2 | DIASTOLIC BLOOD PRESSURE: 60 MMHG | SYSTOLIC BLOOD PRESSURE: 138 MMHG | HEART RATE: 64 BPM | WEIGHT: 203.2 LBS

## 2024-09-03 DIAGNOSIS — R93.1 AGATSTON CAC SCORE, >400: ICD-10-CM

## 2024-09-03 DIAGNOSIS — R07.9 CHEST PAIN, UNSPECIFIED TYPE: Primary | ICD-10-CM

## 2024-09-03 DIAGNOSIS — I10 ESSENTIAL HYPERTENSION: Chronic | ICD-10-CM

## 2024-09-03 PROCEDURE — 3075F SYST BP GE 130 - 139MM HG: CPT | Performed by: INTERNAL MEDICINE

## 2024-09-03 PROCEDURE — 93000 ELECTROCARDIOGRAM COMPLETE: CPT | Performed by: INTERNAL MEDICINE

## 2024-09-03 PROCEDURE — 1036F TOBACCO NON-USER: CPT | Performed by: INTERNAL MEDICINE

## 2024-09-03 PROCEDURE — 3078F DIAST BP <80 MM HG: CPT | Performed by: INTERNAL MEDICINE

## 2024-09-03 PROCEDURE — 3017F COLORECTAL CA SCREEN DOC REV: CPT | Performed by: INTERNAL MEDICINE

## 2024-09-03 PROCEDURE — G8427 DOCREV CUR MEDS BY ELIG CLIN: HCPCS | Performed by: INTERNAL MEDICINE

## 2024-09-03 PROCEDURE — G8417 CALC BMI ABV UP PARAM F/U: HCPCS | Performed by: INTERNAL MEDICINE

## 2024-09-03 PROCEDURE — 1123F ACP DISCUSS/DSCN MKR DOCD: CPT | Performed by: INTERNAL MEDICINE

## 2024-09-03 PROCEDURE — 99204 OFFICE O/P NEW MOD 45 MIN: CPT | Performed by: INTERNAL MEDICINE

## 2024-09-03 ASSESSMENT — ENCOUNTER SYMPTOMS
CHEST TIGHTNESS: 0
COUGH: 0
BACK PAIN: 0
WHEEZING: 0
SHORTNESS OF BREATH: 0
ABDOMINAL DISTENTION: 0
EYE DISCHARGE: 0
COLOR CHANGE: 0
BLOOD IN STOOL: 0
FACIAL SWELLING: 0
ABDOMINAL PAIN: 0
VOMITING: 0

## 2024-09-16 ENCOUNTER — HOSPITAL ENCOUNTER (OUTPATIENT)
Dept: CT IMAGING | Age: 69
Discharge: HOME | End: 2024-09-16
Attending: INTERNAL MEDICINE
Payer: COMMERCIAL

## 2024-09-16 VITALS — SYSTOLIC BLOOD PRESSURE: 133 MMHG | DIASTOLIC BLOOD PRESSURE: 70 MMHG | HEART RATE: 60 BPM

## 2024-09-16 DIAGNOSIS — R07.9 CHEST PAIN, UNSPECIFIED TYPE: ICD-10-CM

## 2024-09-16 DIAGNOSIS — R93.1 AGATSTON CAC SCORE, >400: ICD-10-CM

## 2024-09-16 PROCEDURE — 6370000000 HC RX 637 (ALT 250 FOR IP): Performed by: RADIOLOGY

## 2024-09-16 PROCEDURE — 6360000004 HC RX CONTRAST MEDICATION: Performed by: INTERNAL MEDICINE

## 2024-09-16 PROCEDURE — 75574 CT ANGIO HRT W/3D IMAGE: CPT

## 2024-09-16 RX ORDER — NITROGLYCERIN 0.4 MG/1
0.8 TABLET SUBLINGUAL
Status: COMPLETED | OUTPATIENT
Start: 2024-09-16 | End: 2024-09-16

## 2024-09-16 RX ORDER — IOPAMIDOL 755 MG/ML
100 INJECTION, SOLUTION INTRAVASCULAR
Status: COMPLETED | OUTPATIENT
Start: 2024-09-16 | End: 2024-09-16

## 2024-09-16 RX ORDER — NITROGLYCERIN 0.4 MG/1
0.4 TABLET SUBLINGUAL
Status: COMPLETED | OUTPATIENT
Start: 2024-09-16 | End: 2024-09-16

## 2024-09-16 RX ADMIN — IOPAMIDOL 100 ML: 755 INJECTION, SOLUTION INTRAVENOUS at 15:33

## 2024-09-16 RX ADMIN — NITROGLYCERIN 0.8 MG: 0.4 TABLET, ORALLY DISINTEGRATING SUBLINGUAL at 15:32

## 2024-09-17 ENCOUNTER — HOSPITAL ENCOUNTER (OUTPATIENT)
Dept: CT IMAGING | Age: 69
Discharge: HOME | End: 2024-09-17
Payer: MEDICARE

## 2024-09-17 DIAGNOSIS — Q23.1 BICUSPID AORTIC VALVE: Primary | ICD-10-CM

## 2024-09-17 DIAGNOSIS — R93.89 ABNORMAL COMPUTED TOMOGRAPHY ANGIOGRAPHY (CTA): ICD-10-CM

## 2024-09-17 DIAGNOSIS — Z03.89 UNDER OBSERVATION FOR SUSPECTED CORONARY ARTERY DISEASE: ICD-10-CM

## 2024-09-17 PROCEDURE — 75580 N-INVAS EST C FFR SW ALY CTA: CPT

## 2024-09-19 ENCOUNTER — TELEPHONE (OUTPATIENT)
Dept: CARDIOLOGY CLINIC | Age: 69
End: 2024-09-19

## 2024-09-19 DIAGNOSIS — R93.1 ABNORMAL COMPUTED TOMOGRAPHY ANGIOGRAPHY OF HEART: Primary | ICD-10-CM

## 2024-09-19 NOTE — TELEPHONE ENCOUNTER
Left Heart Catheterization    A left heart catheterization is a procedure that provides your cardiologist with detailed information regarding how your heart functions.  A small catheter (long, fine tube) is inserted into an artery (a vessel that carries blood and oxygen) that leads to your heart.  While watching with x-ray equipment, small amounts of dye are injected which enables visualization of the heart arteries and chambers.  The pictures that your cardiologist receives from the cardiac catheterization enable him or her to decide on the best treatment for you.      Date of the procedure:       Time of arrival:      Cardiologist performing the procedure:  Dr. Graves     Instructions for your left heart catheterization:    1.  Bring a list of your medications to the hospital.    2.  Please notify us before the procedure if you are allergic to anything; especially x-ray contrast dye, iodine, nickel, or any type of jewelry.  This is very important!    3.  Do not eat or drink anything at all after midnight (or 8 hours) prior to the procedure.    4.  Take all morning medications EXCEPT any diuretics (water pills) the day of the procedure with a small sip of water.    5.  If you are on Coumadin, Warfarin, or Jantoven, please notify us so that we can make adjustments to your medication.    6.  If you are taking Xarelto, Eliquis, or Pradaxa, please stop staking these medications two days prior to the procedure (including the day of the procedure).    7.  If you are diabetic, check your blood sugar in the morning.  If your blood sugar is 120 or less, do not take insulin.  If your blood sugar is more than 120, take half the dose of your normal insulin.  Do not take Metformin the night before your procedure or morning of the procedure.    8.  You MUST have someone to drive you home--no driving for 24 hours after your procedure.  If an intervention is performed, you might stay overnight in the hospital.    9.  Discharge

## 2024-09-19 NOTE — TELEPHONE ENCOUNTER
Reviewed instructions with patient, agreeable to angiogram. Please schedule LHC with Dr. Graves at [facility].

## 2024-09-20 PROBLEM — R93.1 ABNORMAL COMPUTED TOMOGRAPHY ANGIOGRAPHY OF HEART: Status: ACTIVE | Noted: 2024-09-19

## 2024-09-23 NOTE — TELEPHONE ENCOUNTER
Pt calling to schedule procedure. He will be out of town starting 10/3 and would like to get it done asap.

## 2024-09-25 NOTE — TELEPHONE ENCOUNTER
Please schedule at [facility] 9/30 at 1:00      The morning of your procedure you will park in the hospital parking lot and report directly to the cath lab to check in.     Pre-Procedure Instructions   You will need to fast for at least 8 hours prior to procedure. No caffeine the morning of.   All other medications can be taken in the morning with sips of water.   You will need to take 325 mg aspirin the morning of.  If you are currently taking 81 mg please take 4 tablets that morning.   Do not use any lotions, creams or perfume the morning of procedure.   Pre-procedure lab work will need to be completed 5-7 days prior to procedure.   Please have a responsible adult to drive you home after procedure. We advise you have someone to stay with you for 24 hours following procedure for precautionary measures. Depending on procedure you may require an overnight stay.   Cath lab will provide you with all post procedure instructions.     If you have any questions regarding the procedure itself or medications, please call [phone] and ask to speak with a nurse.

## 2024-09-26 ENCOUNTER — TELEPHONE (OUTPATIENT)
Dept: CARDIOLOGY CLINIC | Age: 69
End: 2024-09-26

## 2024-09-26 DIAGNOSIS — R93.1 ABNORMAL COMPUTED TOMOGRAPHY ANGIOGRAPHY OF HEART: ICD-10-CM

## 2024-09-26 LAB
ANION GAP SERPL CALCULATED.3IONS-SCNC: 9 MMOL/L (ref 3–16)
BUN SERPL-MCNC: 18 MG/DL (ref 7–20)
CALCIUM SERPL-MCNC: 9.6 MG/DL (ref 8.3–10.6)
CHLORIDE SERPL-SCNC: 100 MMOL/L (ref 99–110)
CO2 SERPL-SCNC: 27 MMOL/L (ref 21–32)
CREAT SERPL-MCNC: 0.9 MG/DL (ref 0.8–1.3)
DEPRECATED RDW RBC AUTO: 13.8 % (ref 12.4–15.4)
GFR SERPLBLD CREATININE-BSD FMLA CKD-EPI: >90 ML/MIN/{1.73_M2}
GLUCOSE SERPL-MCNC: 109 MG/DL (ref 70–99)
HCT VFR BLD AUTO: 38.8 % (ref 40.5–52.5)
HGB BLD-MCNC: 13.5 G/DL (ref 13.5–17.5)
MCH RBC QN AUTO: 34.4 PG (ref 26–34)
MCHC RBC AUTO-ENTMCNC: 34.7 G/DL (ref 31–36)
MCV RBC AUTO: 99.1 FL (ref 80–100)
PLATELET # BLD AUTO: 219 K/UL (ref 135–450)
PMV BLD AUTO: 9 FL (ref 5–10.5)
POTASSIUM SERPL-SCNC: 4 MMOL/L (ref 3.5–5.1)
RBC # BLD AUTO: 3.92 M/UL (ref 4.2–5.9)
SODIUM SERPL-SCNC: 136 MMOL/L (ref 136–145)
WBC # BLD AUTO: 6.4 K/UL (ref 4–11)

## 2024-09-26 NOTE — TELEPHONE ENCOUNTER
Date of Procedure: Monday 9/30/24 @ [facility] with Dr. Graves     Time of arrival: 12:30 pm     Procedure time: 2:00 pm     Spoke to Jason and he is agreeable to date and time. Reviewed and emailed Jason his procedure instructions and he verbalized understanding. Encouraged to call with any questions or concerns.     Published on [employer] and spoke to Sylvie at [facility] lab.

## 2024-09-30 NOTE — TELEPHONE ENCOUNTER
Received a call from [place] that patients procedure will need to be reschedule to tomorrow 10/1 at 12:00   Called patient and he was aware. Reviewed his Brilinta instructions of 90 mg twice daily. He verbalized understanding. Encouraged him to call with any further questions or concerns.

## 2024-10-01 ENCOUNTER — HOSPITAL ENCOUNTER (OUTPATIENT)
Age: 69
Setting detail: OUTPATIENT SURGERY
Discharge: HOME | End: 2024-10-01
Attending: INTERNAL MEDICINE | Admitting: INTERNAL MEDICINE
Payer: COMMERCIAL

## 2024-10-01 VITALS
SYSTOLIC BLOOD PRESSURE: 111 MMHG | WEIGHT: 197.2 LBS | RESPIRATION RATE: 12 BRPM | OXYGEN SATURATION: 97 % | BODY MASS INDEX: 26.71 KG/M2 | HEART RATE: 71 BPM | DIASTOLIC BLOOD PRESSURE: 84 MMHG | HEIGHT: 72 IN

## 2024-10-01 DIAGNOSIS — R93.1 ABNORMAL COMPUTED TOMOGRAPHY ANGIOGRAPHY OF HEART: ICD-10-CM

## 2024-10-01 LAB
EKG ATRIAL RATE: 64 BPM
EKG DIAGNOSIS: NORMAL
EKG P AXIS: 79 DEGREES
EKG P-R INTERVAL: 150 MS
EKG Q-T INTERVAL: 464 MS
EKG QRS DURATION: 84 MS
EKG QTC CALCULATION (BAZETT): 478 MS
EKG R AXIS: 51 DEGREES
EKG T AXIS: 97 DEGREES
EKG VENTRICULAR RATE: 64 BPM
INR PPP: 1.03 (ref 0.85–1.15)
POC ACT LR: 261 SEC
PROTHROMBIN TIME: 13.7 SEC (ref 11.9–14.9)

## 2024-10-01 PROCEDURE — C1725 CATH, TRANSLUMIN NON-LASER: HCPCS | Performed by: INTERNAL MEDICINE

## 2024-10-01 PROCEDURE — 2500000003 HC RX 250 WO HCPCS: Performed by: INTERNAL MEDICINE

## 2024-10-01 PROCEDURE — 92978 ENDOLUMINL IVUS OCT C 1ST: CPT | Performed by: INTERNAL MEDICINE

## 2024-10-01 PROCEDURE — 6360000002 HC RX W HCPCS: Performed by: INTERNAL MEDICINE

## 2024-10-01 PROCEDURE — C1753 CATH, INTRAVAS ULTRASOUND: HCPCS | Performed by: INTERNAL MEDICINE

## 2024-10-01 PROCEDURE — 99152 MOD SED SAME PHYS/QHP 5/>YRS: CPT | Performed by: INTERNAL MEDICINE

## 2024-10-01 PROCEDURE — 6360000004 HC RX CONTRAST MEDICATION: Performed by: INTERNAL MEDICINE

## 2024-10-01 PROCEDURE — 92928 PRQ TCAT PLMT NTRAC ST 1 LES: CPT | Performed by: INTERNAL MEDICINE

## 2024-10-01 PROCEDURE — 93571 IV DOP VEL&/PRESS C FLO 1ST: CPT | Performed by: INTERNAL MEDICINE

## 2024-10-01 PROCEDURE — 6370000000 HC RX 637 (ALT 250 FOR IP): Performed by: INTERNAL MEDICINE

## 2024-10-01 PROCEDURE — 93458 L HRT ARTERY/VENTRICLE ANGIO: CPT | Performed by: INTERNAL MEDICINE

## 2024-10-01 PROCEDURE — 2709999900 HC NON-CHARGEABLE SUPPLY: Performed by: INTERNAL MEDICINE

## 2024-10-01 PROCEDURE — 85610 PROTHROMBIN TIME: CPT

## 2024-10-01 PROCEDURE — C1874 STENT, COATED/COV W/DEL SYS: HCPCS | Performed by: INTERNAL MEDICINE

## 2024-10-01 PROCEDURE — C1887 CATHETER, GUIDING: HCPCS | Performed by: INTERNAL MEDICINE

## 2024-10-01 PROCEDURE — 7100000011 HC PHASE II RECOVERY - ADDTL 15 MIN: Performed by: INTERNAL MEDICINE

## 2024-10-01 PROCEDURE — 99153 MOD SED SAME PHYS/QHP EA: CPT | Performed by: INTERNAL MEDICINE

## 2024-10-01 PROCEDURE — 93005 ELECTROCARDIOGRAM TRACING: CPT | Performed by: INTERNAL MEDICINE

## 2024-10-01 PROCEDURE — 92928 PRQ TCAT PLMT NTRAC ST 1 LES: CPT

## 2024-10-01 PROCEDURE — 93010 ELECTROCARDIOGRAM REPORT: CPT | Performed by: INTERNAL MEDICINE

## 2024-10-01 PROCEDURE — 85347 COAGULATION TIME ACTIVATED: CPT

## 2024-10-01 PROCEDURE — C1769 GUIDE WIRE: HCPCS | Performed by: INTERNAL MEDICINE

## 2024-10-01 PROCEDURE — C1894 INTRO/SHEATH, NON-LASER: HCPCS | Performed by: INTERNAL MEDICINE

## 2024-10-01 PROCEDURE — 7100000010 HC PHASE II RECOVERY - FIRST 15 MIN: Performed by: INTERNAL MEDICINE

## 2024-10-01 PROCEDURE — C9600 PERC DRUG-EL COR STENT SING: HCPCS

## 2024-10-01 PROCEDURE — C9600 PERC DRUG-EL COR STENT SING: HCPCS | Performed by: INTERNAL MEDICINE

## 2024-10-01 DEVICE — XIENCE SIERRA™ EVEROLIMUS ELUTING CORONARY STENT SYSTEM 3.50 MM X 28 MM / RAPID-EXCHANGE
Type: IMPLANTABLE DEVICE | Status: FUNCTIONAL
Brand: XIENCE SIERRA™

## 2024-10-01 DEVICE — XIENCE SIERRA™ EVEROLIMUS ELUTING CORONARY STENT SYSTEM 3.50 MM X 18 MM / RAPID-EXCHANGE
Type: IMPLANTABLE DEVICE | Status: FUNCTIONAL
Brand: XIENCE SIERRA™

## 2024-10-01 RX ORDER — ADENOSINE 3 MG/ML
INJECTION, SOLUTION INTRAVENOUS CONTINUOUS PRN
Status: COMPLETED | OUTPATIENT
Start: 2024-10-01 | End: 2024-10-01

## 2024-10-01 RX ORDER — FENTANYL CITRATE 50 UG/ML
INJECTION, SOLUTION INTRAMUSCULAR; INTRAVENOUS PRN
Status: DISCONTINUED | OUTPATIENT
Start: 2024-10-01 | End: 2024-10-01 | Stop reason: HOSPADM

## 2024-10-01 RX ORDER — MIDAZOLAM HYDROCHLORIDE 1 MG/ML
INJECTION INTRAMUSCULAR; INTRAVENOUS PRN
Status: DISCONTINUED | OUTPATIENT
Start: 2024-10-01 | End: 2024-10-01 | Stop reason: HOSPADM

## 2024-10-01 RX ORDER — ASPIRIN 325 MG
325 TABLET ORAL ONCE
Status: COMPLETED | OUTPATIENT
Start: 2024-10-01 | End: 2024-10-01

## 2024-10-01 RX ORDER — ASPIRIN 81 MG/1
81 TABLET ORAL DAILY
Qty: 90 TABLET | Refills: 0 | Status: SHIPPED | OUTPATIENT
Start: 2024-10-01

## 2024-10-01 RX ORDER — IOPAMIDOL 755 MG/ML
INJECTION, SOLUTION INTRAVASCULAR PRN
Status: DISCONTINUED | OUTPATIENT
Start: 2024-10-01 | End: 2024-10-01 | Stop reason: HOSPADM

## 2024-10-01 RX ORDER — HEPARIN SODIUM 1000 [USP'U]/ML
INJECTION, SOLUTION INTRAVENOUS; SUBCUTANEOUS PRN
Status: DISCONTINUED | OUTPATIENT
Start: 2024-10-01 | End: 2024-10-01 | Stop reason: HOSPADM

## 2024-10-01 RX ORDER — EPTIFIBATIDE 0.75 MG/ML
INJECTION, SOLUTION INTRAVENOUS CONTINUOUS PRN
Status: COMPLETED | OUTPATIENT
Start: 2024-10-01 | End: 2024-10-01

## 2024-10-01 RX ADMIN — ASPIRIN 325 MG: 325 TABLET ORAL at 11:28

## 2024-10-01 NOTE — PROGRESS NOTES
Cath Lab Pre Procedure Flowsheet    Plan of Care:     Hemodynamics and cardiac rhythm will remain stable.   Comfort level will be maintained.   Respiratory function will remain adequate.   Pt/family will verbalize understanding of the procedure.   Procedure will be tolerated without complications.   Patient will recover from procedure without complications.   ID armband on patient and identification verified.   Informed consent obtained.   Non invasive blood pressure cuff applied, monitoring initiated.   EKG pads and pulse oximeter applied, monitoring initiated.   Instructions given. Patient and / or family verbalize understanding.   H&P will be documented by physician in [facility].     Pre-procedure:    NPO Status: NPO since 0330 .    Pregnancy Test: N/A.    Prep Sites: Wrist(s)  Groin(s)    Pulses:  Bilat Radial 2, Bilat Femoral 2, Bilat DP 2, Bilat PT 2    Lexx's Test: Adequate blood flow    Pre SBP:  132    Pre EKG Rhythm:  Sinus Rhythm    Pre-procedure blood work collected by: Cnithia JONES RN    IV access:  PIV #[id number] [id number]    Prior Cath/CABG on Chart: No    Echo Date: 9/25/24       EF:  55-60%    Stress Test Date: N/A    EKG Date: 10/1/2024    Anticoagulants: None.     Antiplatelets: Brilinta. Last Dose: 10/1/2024.  Any missed doses:  No..     Chief Complaint:   Positive CTA    Admit Source: Outpatient    Admission Date:  10/1/2024. TIME:      Surgeries Planned: No    Bleeding Problems: No    Medication Compliance Issues: Yes    Hypertension: Yes    Dyslipidemia: No    Family History of CAD: Yes    Prior MI: No    Prior PCI: No    Prior CABG: No    Cerebral Vascular Disease: No    Peripheral Arterial Disease: No    Chronic Lung Disease: No    Tobacco: Never    Diabetic: No    Cardiac Arrest: No    Dialysis: No    Frailty Score: 1 VERY FIT (robust, energetic, exercise regularly, fittest for their age)    Recreational Drug Use: No

## 2024-10-01 NOTE — H&P
[facility]  Cardiology Consult    Gómez Peace  1955    October 1, 2024      CC: Abnormal CTA      Subjective:     History of Present Illness:    Gómez Peace is a 68 y.o. patient with a PMH significant for HTN presented with complaints of abnormal CTA.        Past Medical History:   has a past medical history of Hypertension and Prostate cancer (HCC).    Surgical History:   has a past surgical history that includes Inguinal hernia repair (Left, 1971); Cataract removal with implant (Bilateral, 2013); Colonoscopy; and Abdomen surgery (N/A, 3/15/2024).     Social History:   reports that he has never smoked. He has never used smokeless tobacco. He reports that he does not currently use alcohol. He reports that he does not use drugs.     Family History:  family history includes Cancer in his father and mother; Dementia in his mother; Heart Disease in his father.    Home Medications:  Were reviewed and are listed in nursing record and/or below  Prior to Admission medications    Medication Sig Start Date End Date Taking? Authorizing Provider   ticagrelor (BRILINTA) 90 MG TABS tablet Take 180 mg x 1 dose for the first dose, then take 90 mg by mouth twice daily thereafter. 9/26/24  Yes Alan Graves MD   valsartan-hydroCHLOROthiazide (DIOVAN-HCT) 160-12.5 MG per tablet Take 1 tablet by mouth daily 8/6/24  Yes Padmini De Leon MD   rosuvastatin (CRESTOR) 5 MG tablet Take 1 tablet by mouth nightly 6/25/24  Yes Padmini De Leon MD   Multiple Vitamins-Minerals (THERAPEUTIC MULTIVITAMIN-MINERALS) tablet Take 1 tablet by mouth daily   Yes Provider, MD Raji        CURRENT Medications:  midazolam (VERSED) injection, PRN  fentaNYL (SUBLIMAZE) injection, PRN  midazolam (VERSED) injection, PRN  fentaNYL (SUBLIMAZE) injection, PRN  heparin (porcine) 3,000 Units, nitroGLYCERIN 200 mcg, verapamil (ISOPTIN) 2.5 mg 5 mL syringe, PRN  heparin (porcine) injection, PRN  adenosine (diagnostic) (ADENOSCAN)  Myself      EKG:     Echocardiogram:  Image quality is adequate.    Left Ventricle: Normal left ventricular systolic function with a visually estimated EF of 55 - 60%. Left ventricle size is normal. Normal wall thickness. Normal wall motion. Indeterminate diastolic function.    Right Ventricle: Right ventricle size is normal. Normal systolic function.    Aortic Valve: Bicuspid valve. Thickened cusps. Mild stenosis of the aortic valve. AV mean gradient is 13 mmHg. AV peak velocity is 2.4 m/s. AV area by continuity VTI is 1.4 cm2.    Mitral Valve: Mild regurgitation.    Tricuspid Valve: The estimated RVSP is 25 mmHg.    Pulmonic Valve: Trace regurgitation.    Left Atrium: Left atrium is mildly dilated.    Stress Test:     Cath:    Other imaging: FFR of the distal RCA 0.92.  FFR of the circumflex artery is 0.50 distal to its origin.  FFR of the mid obtuse marginal branch is 0.50.  FFR of the LAD is 0.72 mid and 0.62 distal.  FFR of the distal first diagonal artery is 0.65.    Assessment and Plan     -CAD with abnormal CTA and Heart flow FFR    Plan [facility] today     I had a detail discussion with the patient and the family members regarding the risk and benefit of the procedures. I explained to them the details of the procedure. I explained to them that the procedure will be performed using moderate sedation and local anaesthesia using lidocaine.     I explained any risk of bleeding, perforation of the vessel, stroke, myocardial infarction or death.   Also explained to the patient need for any emergent open heart surgery and CABG to save the patient's life.     I have presented reasonable alternatives to the patient's proposed care, treatment, and services. The discussion I have done encompassed risks, benefits, and side effects related to the alternatives and the risks related to not receiving the proposed care, treatment, and services.    The patient and the family members understood the risk and benefits and the details

## 2024-10-01 NOTE — DISCHARGE INSTRUCTIONS
[facility]  Cardiovascular Special Procedures   Radial Access Angiogram  Patient Discharge Instructions      Day 1 (Procedure Day):  Rest for the remainder of the day.  Avoid excessive use of the affected arm.  Do not drive a car.  Do not be alone.  Leave dressing intact.    Day 2:  You may drive a car, unless otherwise directed by physician.  Remove dressing.  You may bathe/shower, but wash gently around the puncture site and pat dry.  Place new band-aid on site daily until skin heals.      Things to Avoid:  You may not do any strenuous exercises for one week. (ex: golfing, bowling, tennis, vacuum, snow removal, jogging, and aerobic exercises).  No hot tubs, baths, or pools for 3-5 days.  Do not lift anything over 3-5 pounds for 3 days, with affected arm.  No lotions, powders, or ointments near site for 5 days.    Things to watch for:  You may have a small lump or a bruise.  This is common and will go away.  If bleeding occurs from the site, or a hematoma (lump) begins to increase in size, immediately apply pressure directly over the site and call 911 to return to the hospital.  Report any coolness or numbness in the arm or hand immediately.  Report any excessive pain of the arm or hand immediately.  If mild discomfort occurs at the puncture site you may place an ice pack on the site at 15 minute intervals..   Watch for signs and symptoms of an infection at the arm site (fever, local pain, redness, warmth or discharge from the puncture site), call your physician immediately if any develop.    Other:  No work/school for 72 hours if you received a stent; otherwise you may go back to work the following day (as long as your job does not interfere with the lifting restrictions).      Any Questions please call us at [phone] (between 7am- 5pm, Mon-Fri).  After hours you should contact your physician.          [facility]  Cardiovascular Special Procedures  General Discharge Instructions    PROCEDURE:

## 2024-10-02 ENCOUNTER — TELEPHONE (OUTPATIENT)
Dept: CARDIOLOGY CLINIC | Age: 69
End: 2024-10-02

## 2024-10-02 NOTE — TELEPHONE ENCOUNTER
Patient returned call and he says that he is doing well. He went for a walk an tolerated fine. I have him scheduled for follow up in 10/28 at the  office per his request.

## 2024-10-02 NOTE — TELEPHONE ENCOUNTER
Called patient and left message, wanted to see how he is feeling after his procedure yesterday and get him a follow up scheduled to see Dr Graves at [place]. He can be scheduled at [place] on 11/15 at 8:00

## 2024-10-03 DIAGNOSIS — Z95.5 S/P CORONARY ARTERY STENT PLACEMENT: Primary | ICD-10-CM

## 2024-10-03 LAB — ECHO BSA: 2.13 M2

## 2024-10-09 ENCOUNTER — OFFICE VISIT (OUTPATIENT)
Dept: CARDIOLOGY CLINIC | Age: 69
End: 2024-10-09
Payer: COMMERCIAL

## 2024-10-09 VITALS
BODY MASS INDEX: 27.67 KG/M2 | WEIGHT: 204 LBS | HEART RATE: 69 BPM | DIASTOLIC BLOOD PRESSURE: 64 MMHG | SYSTOLIC BLOOD PRESSURE: 110 MMHG

## 2024-10-09 DIAGNOSIS — Z98.61 CAD S/P PERCUTANEOUS CORONARY ANGIOPLASTY: Primary | ICD-10-CM

## 2024-10-09 DIAGNOSIS — I10 PRIMARY HYPERTENSION: ICD-10-CM

## 2024-10-09 DIAGNOSIS — E78.2 MIXED HYPERLIPIDEMIA: ICD-10-CM

## 2024-10-09 DIAGNOSIS — I25.10 CAD S/P PERCUTANEOUS CORONARY ANGIOPLASTY: Primary | ICD-10-CM

## 2024-10-09 PROCEDURE — 1123F ACP DISCUSS/DSCN MKR DOCD: CPT | Performed by: NURSE PRACTITIONER

## 2024-10-09 PROCEDURE — 99214 OFFICE O/P EST MOD 30 MIN: CPT | Performed by: NURSE PRACTITIONER

## 2024-10-09 PROCEDURE — 3074F SYST BP LT 130 MM HG: CPT | Performed by: NURSE PRACTITIONER

## 2024-10-09 PROCEDURE — 3078F DIAST BP <80 MM HG: CPT | Performed by: NURSE PRACTITIONER

## 2024-10-09 NOTE — PROGRESS NOTES
[place] [address]  [phone]    Primary Cardiologist: Dr Bettencourt    CC follow up after LHC (with Dr Graves)    HPI:  68 y.o. with HTN who had an abnormal coronary CTA and an elevated coronary calcium score so he had an LHC and the LAD and LCx were stented.  He denies cp, sob, LH/dizziness, palpitations, syncope or falls. No weight gain, edema, orthopnea, abdominal bloating or early satiety. No n/v/d, fever or GI/ bleeding. Denies right radial pain, numbness or bleeding. He is a avid runner and likes to run half marathons.     Past Medical History:   Diagnosis Date    CAD S/P percutaneous coronary angioplasty 10/2024    LAD 3.5x 28 mm ACE; mid LCx 3.5x 18 mm ACE    Hypertension     Prostate cancer (HCC) 2013    brachytherapy     Past Surgical History:   Procedure Laterality Date    ABDOMEN SURGERY N/A 3/15/2024    EXCISION OF BACK LESION performed by Polly Guidry MD at [facility] OR    CARDIAC PROCEDURE N/A 10/1/2024    Left heart cath / coronary angiography performed by Alan Graves MD at [facility] CARDIAC CATH LAB    CARDIAC PROCEDURE N/A 10/1/2024    Percutaneous coronary intervention performed by Alan Graves MD at [facility] CARDIAC CATH LAB    CATARACT REMOVAL WITH IMPLANT Bilateral 2013    COLONOSCOPY      INGUINAL HERNIA REPAIR Left 1971    injury to seminal vesicles, rendered him infertile     Family History   Problem Relation Age of Onset    Cancer Mother     Dementia Mother     Cancer Father     Heart Disease Father         CHF     Social History     Tobacco Use    Smoking status: Never    Smokeless tobacco: Never   Vaping Use    Vaping status: Never Used   Substance Use Topics    Alcohol use: Not Currently    Drug use: Never     Allergies:No known allergies    Review of Systems  All 12 point review of symptoms completed. Pertinent positives identified in the HPI, all other review of symptoms negative.    Physical Exam:  /64   Pulse 69   Wt 92.5 kg (204 lb)

## 2024-10-28 ENCOUNTER — OFFICE VISIT (OUTPATIENT)
Dept: CARDIOLOGY CLINIC | Age: 69
End: 2024-10-28

## 2024-10-28 VITALS
HEIGHT: 72 IN | DIASTOLIC BLOOD PRESSURE: 68 MMHG | SYSTOLIC BLOOD PRESSURE: 136 MMHG | HEART RATE: 67 BPM | BODY MASS INDEX: 27.36 KG/M2 | WEIGHT: 202 LBS | OXYGEN SATURATION: 98 %

## 2024-10-28 DIAGNOSIS — I25.10 CAD S/P PERCUTANEOUS CORONARY ANGIOPLASTY: Primary | ICD-10-CM

## 2024-10-28 DIAGNOSIS — Z98.61 CAD S/P PERCUTANEOUS CORONARY ANGIOPLASTY: Primary | ICD-10-CM

## 2024-10-28 DIAGNOSIS — E78.2 MIXED HYPERLIPIDEMIA: ICD-10-CM

## 2024-10-28 DIAGNOSIS — I10 PRIMARY HYPERTENSION: ICD-10-CM

## 2024-10-28 NOTE — PROGRESS NOTES
[facility] [place]    Gómez Peace  1955    October 28, 2024      Reason for Referral: Coronary artery disease    Referring physician: Dr Bettencourt    CC: \"I am doing okay.\"      Subjective:     History of Present Illness:    Gómez Peace is a 68 y.o. patient with a PMH significant for coronary artery disease, hypertension and prostate cancer. He had an abnormal coronary CTA in 9/2024 and was scheduled for [facility] and underwent intervention of LAD and LCx.     Today, he is here for follow up s/p PCI. He is doing well. He has resumed exercising without limitations. He does notice that he does have gas since starting the Brilinta. He did walked part of a half marathon 4 days after the PCI. Patient denies exertional chest pain/pressure, dyspnea at rest, MCKINNEY, PND, orthopnea, palpitations, lightheadedness, weight changes, changes in LE edema, and syncope. Patient reports compliance to his medications.     Past Medical History:   has a past medical history of Abnormal ECG, CAD S/P percutaneous coronary angioplasty, Hypertension, and Prostate cancer (HCC).    Surgical History:   has a past surgical history that includes Inguinal hernia repair (Left, 1971); Cataract removal with implant (Bilateral, 2013); Colonoscopy; Abdomen surgery (N/A, 3/15/2024); Cardiac procedure (N/A, 10/1/2024); and Cardiac procedure (N/A, 10/1/2024).     Social History:   reports that he has never smoked. He has never used smokeless tobacco. He reports that he does not currently use alcohol. He reports that he does not use drugs.     Family History:  family history includes Cancer in his father and mother; Dementia in his mother; Heart Disease in his brother, father, mother, and sister.    Home Medications:  Were reviewed and are listed in nursing record and/or below  Prior to Admission medications    Medication Sig Start Date End Date Taking? Authorizing Provider   ticagrelor (BRILINTA) 90 MG TABS tablet Take 1

## 2024-12-17 ENCOUNTER — TELEPHONE (OUTPATIENT)
Dept: CARDIOLOGY CLINIC | Age: 69
End: 2024-12-17

## 2024-12-17 NOTE — TELEPHONE ENCOUNTER
The patient called stating he had stents placed on 10/01/24 and he would like to know how much time he has to wait before he's abl to donate blood. Please advise [phone]

## 2024-12-23 RX ORDER — TICAGRELOR 90 MG/1
90 TABLET ORAL 2 TIMES DAILY
Qty: 180 TABLET | Refills: 1 | Status: SHIPPED | OUTPATIENT
Start: 2024-12-23

## 2024-12-23 NOTE — TELEPHONE ENCOUNTER
Requested Prescriptions     Pending Prescriptions Disp Refills    BRILINTA 90 MG TABS tablet [Pharmacy Med Name: BRILINTA 90 MG TABLET] 60 tablet 1     Sig: TAKE 1 TABLET BY MOUTH 2 TIMES A DAY      Last OV:  10/28/2024 AGV    Next OV: Visit/ recall date not found     Last Labs: 07/05/2024 CBC    Last Filled: 10/01/2024 AGV

## 2025-01-15 ENCOUNTER — OFFICE VISIT (OUTPATIENT)
Dept: PRIMARY CARE CLINIC | Age: 70
End: 2025-01-15

## 2025-01-15 VITALS
WEIGHT: 206 LBS | OXYGEN SATURATION: 98 % | HEART RATE: 72 BPM | DIASTOLIC BLOOD PRESSURE: 82 MMHG | BODY MASS INDEX: 29.49 KG/M2 | TEMPERATURE: 97.1 F | SYSTOLIC BLOOD PRESSURE: 128 MMHG | HEIGHT: 70 IN

## 2025-01-15 DIAGNOSIS — I10 ESSENTIAL HYPERTENSION: Chronic | ICD-10-CM

## 2025-01-15 DIAGNOSIS — C61 MALIGNANT NEOPLASM OF PROSTATE (HCC): ICD-10-CM

## 2025-01-15 DIAGNOSIS — Z00.00 MEDICARE ANNUAL WELLNESS VISIT, SUBSEQUENT: Primary | ICD-10-CM

## 2025-01-15 DIAGNOSIS — Z23 NEED FOR PNEUMOCOCCAL VACCINATION: ICD-10-CM

## 2025-01-15 DIAGNOSIS — Z95.5 HX OF HEART ARTERY STENT: ICD-10-CM

## 2025-01-15 SDOH — ECONOMIC STABILITY: FOOD INSECURITY: WITHIN THE PAST 12 MONTHS, YOU WORRIED THAT YOUR FOOD WOULD RUN OUT BEFORE YOU GOT MONEY TO BUY MORE.: NEVER TRUE

## 2025-01-15 SDOH — ECONOMIC STABILITY: FOOD INSECURITY: WITHIN THE PAST 12 MONTHS, THE FOOD YOU BOUGHT JUST DIDN'T LAST AND YOU DIDN'T HAVE MONEY TO GET MORE.: NEVER TRUE

## 2025-01-15 ASSESSMENT — PATIENT HEALTH QUESTIONNAIRE - PHQ9
7. TROUBLE CONCENTRATING ON THINGS, SUCH AS READING THE NEWSPAPER OR WATCHING TELEVISION: NOT AT ALL
5. POOR APPETITE OR OVEREATING: NOT AT ALL
2. FEELING DOWN, DEPRESSED OR HOPELESS: NOT AT ALL
1. LITTLE INTEREST OR PLEASURE IN DOING THINGS: NOT AT ALL
SUM OF ALL RESPONSES TO PHQ9 QUESTIONS 1 & 2: 0
SUM OF ALL RESPONSES TO PHQ QUESTIONS 1-9: 0
3. TROUBLE FALLING OR STAYING ASLEEP: NOT AT ALL
6. FEELING BAD ABOUT YOURSELF - OR THAT YOU ARE A FAILURE OR HAVE LET YOURSELF OR YOUR FAMILY DOWN: NOT AT ALL
SUM OF ALL RESPONSES TO PHQ QUESTIONS 1-9: 0
SUM OF ALL RESPONSES TO PHQ QUESTIONS 1-9: 0
10. IF YOU CHECKED OFF ANY PROBLEMS, HOW DIFFICULT HAVE THESE PROBLEMS MADE IT FOR YOU TO DO YOUR WORK, TAKE CARE OF THINGS AT HOME, OR GET ALONG WITH OTHER PEOPLE: NOT DIFFICULT AT ALL
9. THOUGHTS THAT YOU WOULD BE BETTER OFF DEAD, OR OF HURTING YOURSELF: NOT AT ALL
SUM OF ALL RESPONSES TO PHQ QUESTIONS 1-9: 0
8. MOVING OR SPEAKING SO SLOWLY THAT OTHER PEOPLE COULD HAVE NOTICED. OR THE OPPOSITE, BEING SO FIGETY OR RESTLESS THAT YOU HAVE BEEN MOVING AROUND A LOT MORE THAN USUAL: NOT AT ALL
4. FEELING TIRED OR HAVING LITTLE ENERGY: NOT AT ALL

## 2025-01-15 ASSESSMENT — LIFESTYLE VARIABLES
HOW OFTEN DO YOU HAVE A DRINK CONTAINING ALCOHOL: NEVER
HOW MANY STANDARD DRINKS CONTAINING ALCOHOL DO YOU HAVE ON A TYPICAL DAY: PATIENT DOES NOT DRINK

## 2025-01-15 NOTE — PROGRESS NOTES
Care Team:  Padmini De Leon MD as PCP - General (Family Medicine)  Padmini De Leon MD as PCP - Empaneled Provider     Recommendations for Preventive Services Due: see orders and patient instructions/AVS.  Recommended screening schedule for the next 5-10 years is provided to the patient in written form: see Patient Instructions/AVS.     Reviewed and updated this visit:  Allergies  Meds  Med Hx  Surg Hx  Soc Hx  Fam Hx

## 2025-01-27 ENCOUNTER — TELEPHONE (OUTPATIENT)
Dept: CARDIOLOGY CLINIC | Age: 70
End: 2025-01-27

## 2025-01-27 NOTE — TELEPHONE ENCOUNTER
Spoke to PT. He advised that Brilinta has went from $49 to almost $200. He is thinking that [facility] may have entered the script in incorrectly in the system. I advised PT to check with [employer] again and call his insurance to verify pricing. If this medication is still not affordable, he can try patient assistance. PT ok with this information.

## 2025-01-27 NOTE — TELEPHONE ENCOUNTER
Medication Question/Concern    What is the name of the medication you need to speak with someone about?  ticagrelor (BRILINTA)      Was this prescribed by your cardiologist?   Yes    Dosage of the medication:  90 MG TABS tablet     How are you taking this medication (QD, BID, TID, QID, PRN):  TAKE 1 TABLET BY MOUTH 2 TIMES A DAY     What issues/concerns are you having with this medication?  Patient called in wanting to know if there was another medication he can be put on.   He states when he went to get his refill, the pharmacy alerted him that the medication price went up, and it would be triple the amount he normally pays.     He would like to know if prescription can be updated to something more affordable, or what his options are.     Please advise.     Callback: [phone]

## 2025-02-03 RX ORDER — VALSARTAN AND HYDROCHLOROTHIAZIDE 160; 12.5 MG/1; MG/1
1 TABLET, FILM COATED ORAL DAILY
Qty: 90 TABLET | Refills: 1 | Status: SHIPPED | OUTPATIENT
Start: 2025-02-03

## 2025-02-03 NOTE — TELEPHONE ENCOUNTER
Medication:   Requested Prescriptions     Pending Prescriptions Disp Refills    valsartan-hydroCHLOROthiazide (DIOVAN-HCT) 160-12.5 MG per tablet [Pharmacy Med Name: VALSARTAN-HCTZ 160-12.5 MG TAB] 90 tablet 1     Sig: TAKE 1 TABLET BY MOUTH DAILY     Last Filled:  8.6.24    Last appt: 1/15/2025   Next appt: Visit date not found    Last OARRS:        No data to display

## 2025-02-24 ENCOUNTER — TELEPHONE (OUTPATIENT)
Dept: PRIMARY CARE CLINIC | Age: 70
End: 2025-02-24

## 2025-02-24 NOTE — PROGRESS NOTES
ENCOUNTER DATE: 2/25/2025     NAME: Gómez Peace   AGE: 69 y.o.   GENDER: male   YOB: 1955    Chief Complaint   Patient presents with    Congestion     Started 2/15/25    Cough       ASSESSMENT/PLAN:  1. Bronchitis  Discussed CXR.  Will defer at this time since this would not change course of treatment  Started on steroids and antibiotics  Continue with Mucinex  Inhaler for as needed use  Increase fluids  If no improvement, should follow-up with PCP  - methylPREDNISolone (MEDROL, BLANCHE,) 4 MG tablet; Take by mouth per package instruction  Dispense: 1 kit; Refill: 0  - amoxicillin-clavulanate (AUGMENTIN) 875-125 MG per tablet; Take 1 tablet by mouth 2 times daily for 10 days  Dispense: 20 tablet; Refill: 0  - albuterol sulfate HFA (PROVENTIL HFA) 108 (90 Base) MCG/ACT inhaler; Inhale 2 puffs into the lungs every 6 hours as needed for Wheezing  Dispense: 18 g; Refill: 0  - promethazine-dextromethorphan (PROMETHAZINE-DM) 6.25-15 MG/5ML syrup; Take 5 mLs by mouth 4 times daily as needed for Cough  Dispense: 118 mL; Refill: 0      Return if symptoms worsen or fail to improve.     HPI:   Patient is here for sick visit    Onset: 2/15/25  Has chest congestion.   Cough is productive, white in color. Was darker.   Cough is worse at night. Coughing fits.   No fevers. No nasal congestion or drainage. No sinus pressure.   Some shortness of breath and wheezing, especially with coughing fits.  No sore throat. No earaches.   Was able to run a half marathon this past wknd, but was not his usual time.     Taking mucinex, dayquil/nyquil  No covid test.     Had recent exposure to a sick coworker.     ROS:  Review of Systems   Constitutional:  Negative for chills, fatigue and fever.   HENT:  Negative for congestion, ear pain, postnasal drip, rhinorrhea, sinus pressure, sinus pain, sore throat, trouble swallowing and voice change.    Respiratory:  Positive for cough, shortness of breath and wheezing. Negative for

## 2025-02-25 ENCOUNTER — OFFICE VISIT (OUTPATIENT)
Dept: PRIMARY CARE CLINIC | Age: 70
End: 2025-02-25

## 2025-02-25 VITALS
WEIGHT: 199 LBS | SYSTOLIC BLOOD PRESSURE: 132 MMHG | HEART RATE: 66 BPM | TEMPERATURE: 97.8 F | RESPIRATION RATE: 16 BRPM | BODY MASS INDEX: 28.55 KG/M2 | DIASTOLIC BLOOD PRESSURE: 76 MMHG | OXYGEN SATURATION: 96 %

## 2025-02-25 DIAGNOSIS — J40 BRONCHITIS: Primary | ICD-10-CM

## 2025-02-25 RX ORDER — DEXTROMETHORPHAN HYDROBROMIDE AND PROMETHAZINE HYDROCHLORIDE 15; 6.25 MG/5ML; MG/5ML
5 SYRUP ORAL 4 TIMES DAILY PRN
Qty: 118 ML | Refills: 0 | Status: SHIPPED | OUTPATIENT
Start: 2025-02-25 | End: 2025-03-04

## 2025-02-25 RX ORDER — ALBUTEROL SULFATE 90 UG/1
2 INHALANT RESPIRATORY (INHALATION) EVERY 6 HOURS PRN
Qty: 18 G | Refills: 0 | Status: SHIPPED | OUTPATIENT
Start: 2025-02-25

## 2025-02-25 RX ORDER — METHYLPREDNISOLONE 4 MG/1
TABLET ORAL
Qty: 1 KIT | Refills: 0 | Status: SHIPPED | OUTPATIENT
Start: 2025-02-25

## 2025-02-25 ASSESSMENT — ENCOUNTER SYMPTOMS
TROUBLE SWALLOWING: 0
COUGH: 1
VOMITING: 0
VOICE CHANGE: 0
SORE THROAT: 0
CHEST TIGHTNESS: 0
WHEEZING: 1
ABDOMINAL PAIN: 0
SHORTNESS OF BREATH: 1
SINUS PAIN: 0
SINUS PRESSURE: 0
NAUSEA: 0
DIARRHEA: 0
RHINORRHEA: 0

## 2025-03-25 ENCOUNTER — HOSPITAL ENCOUNTER (OUTPATIENT)
Age: 70
Discharge: HOME | End: 2025-03-25
Payer: COMMERCIAL

## 2025-03-25 DIAGNOSIS — I10 ESSENTIAL HYPERTENSION: Chronic | ICD-10-CM

## 2025-03-25 LAB
ALBUMIN SERPL-MCNC: 4.1 G/DL (ref 3.4–5)
ALBUMIN/GLOB SERPL: 1.5 {RATIO} (ref 1.1–2.2)
ALP SERPL-CCNC: 79 U/L (ref 40–129)
ALT SERPL-CCNC: 23 U/L (ref 10–40)
ANION GAP SERPL CALCULATED.3IONS-SCNC: 8 MMOL/L (ref 3–16)
AST SERPL-CCNC: 28 U/L (ref 15–37)
BILIRUB SERPL-MCNC: 1 MG/DL (ref 0–1)
BILIRUB UR QL STRIP.AUTO: NEGATIVE
BUN SERPL-MCNC: 18 MG/DL (ref 7–20)
CALCIUM SERPL-MCNC: 8.9 MG/DL (ref 8.3–10.6)
CHLORIDE SERPL-SCNC: 103 MMOL/L (ref 99–110)
CLARITY UR: CLEAR
CO2 SERPL-SCNC: 28 MMOL/L (ref 21–32)
COLOR UR: YELLOW
CREAT SERPL-MCNC: 0.9 MG/DL (ref 0.8–1.3)
GFR SERPLBLD CREATININE-BSD FMLA CKD-EPI: >90 ML/MIN/{1.73_M2}
GLUCOSE SERPL-MCNC: 84 MG/DL (ref 70–99)
GLUCOSE UR STRIP.AUTO-MCNC: NEGATIVE MG/DL
HGB UR QL STRIP.AUTO: NEGATIVE
KETONES UR STRIP.AUTO-MCNC: NEGATIVE MG/DL
LEUKOCYTE ESTERASE UR QL STRIP.AUTO: NEGATIVE
NITRITE UR QL STRIP.AUTO: NEGATIVE
PH UR STRIP.AUTO: 8.5 [PH] (ref 5–8)
POTASSIUM SERPL-SCNC: 3.6 MMOL/L (ref 3.5–5.1)
PROT SERPL-MCNC: 6.8 G/DL (ref 6.4–8.2)
PROT UR STRIP.AUTO-MCNC: NEGATIVE MG/DL
SODIUM SERPL-SCNC: 139 MMOL/L (ref 136–145)
SP GR UR STRIP.AUTO: 1.02 (ref 1–1.03)
UA COMPLETE W REFLEX CULTURE PNL UR: ABNORMAL
UA DIPSTICK W REFLEX MICRO PNL UR: ABNORMAL
URN SPEC COLLECT METH UR: ABNORMAL
UROBILINOGEN UR STRIP-ACNC: 0.2 E.U./DL

## 2025-03-25 PROCEDURE — 36415 COLL VENOUS BLD VENIPUNCTURE: CPT

## 2025-03-25 PROCEDURE — 80053 COMPREHEN METABOLIC PANEL: CPT

## 2025-03-25 PROCEDURE — 81003 URINALYSIS AUTO W/O SCOPE: CPT

## 2025-03-26 ENCOUNTER — RESULTS FOLLOW-UP (OUTPATIENT)
Dept: PRIMARY CARE CLINIC | Age: 70
End: 2025-03-26

## 2025-04-10 ENCOUNTER — OFFICE VISIT (OUTPATIENT)
Dept: CARDIOLOGY CLINIC | Age: 70
End: 2025-04-10
Payer: COMMERCIAL

## 2025-04-10 VITALS
DIASTOLIC BLOOD PRESSURE: 78 MMHG | SYSTOLIC BLOOD PRESSURE: 130 MMHG | HEART RATE: 64 BPM | BODY MASS INDEX: 28.67 KG/M2 | WEIGHT: 199.8 LBS

## 2025-04-10 DIAGNOSIS — I10 ESSENTIAL HYPERTENSION: Primary | Chronic | ICD-10-CM

## 2025-04-10 DIAGNOSIS — I25.10 CORONARY ARTERY DISEASE INVOLVING NATIVE CORONARY ARTERY OF NATIVE HEART WITHOUT ANGINA PECTORIS: ICD-10-CM

## 2025-04-10 DIAGNOSIS — E78.2 MIXED HYPERLIPIDEMIA: ICD-10-CM

## 2025-04-10 DIAGNOSIS — Q23.81 BICUSPID AORTIC VALVE: ICD-10-CM

## 2025-04-10 PROCEDURE — 3078F DIAST BP <80 MM HG: CPT | Performed by: INTERNAL MEDICINE

## 2025-04-10 PROCEDURE — 99214 OFFICE O/P EST MOD 30 MIN: CPT | Performed by: INTERNAL MEDICINE

## 2025-04-10 PROCEDURE — 3075F SYST BP GE 130 - 139MM HG: CPT | Performed by: INTERNAL MEDICINE

## 2025-04-10 PROCEDURE — 1123F ACP DISCUSS/DSCN MKR DOCD: CPT | Performed by: INTERNAL MEDICINE

## 2025-04-10 PROCEDURE — G2211 COMPLEX E/M VISIT ADD ON: HCPCS | Performed by: INTERNAL MEDICINE

## 2025-04-10 RX ORDER — ROSUVASTATIN CALCIUM 20 MG/1
20 TABLET, COATED ORAL NIGHTLY
Qty: 90 TABLET | Refills: 3 | Status: SHIPPED | OUTPATIENT
Start: 2025-04-10

## 2025-04-10 ASSESSMENT — ENCOUNTER SYMPTOMS
BLOOD IN STOOL: 0
FACIAL SWELLING: 0
CHEST TIGHTNESS: 0
SHORTNESS OF BREATH: 0
ABDOMINAL PAIN: 0
COLOR CHANGE: 0
ABDOMINAL DISTENTION: 0
VOMITING: 0
WHEEZING: 0
EYE DISCHARGE: 0
BACK PAIN: 0
COUGH: 0

## 2025-04-10 NOTE — PROGRESS NOTES
9/17/24  IMPRESSION :      Bicuspid aortic valve     Moderate stenosis of the proximal circumflex, proximal OM1, and mid LAD.     Calcium score of 839.  CAD RADS category 3/P3    INTERPRETATION:     FFR of the distal RCA 0.92.  FFR of the circumflex artery is 0.50 distal to its origin.  FFR of the mid obtuse marginal branch is 0.50.  FFR of the LAD is 0.72 mid and 0.62 distal.  FFR of the distal first diagonal artery is 0.65.     Further evaluation with conventional coronary angiography is indicated.    Assessment / Plan:     Essential hypertension  Controlled on Diovan HCT  No side effects    Mixed hyperlipidemia  LDL at goal  On crestor 5 but would like to try 20 given CAD    Coronary artery disease involving native coronary artery of native heart without angina pectoris  No angina  DAPT until 10/25  Crestor    Bicuspid aortic valve  Without significant valve disease, reassess with echo in a couple years    Follow up in 6 months.    I had the opportunity to review the clinical symptoms and presentation of Gómez Peace.     Patient's allergies and medications were reviewed and updated. Patient's past medical, surgical, social and family history were reviewed and updated.   Patient's testing including laboratory, ECGs, monitor, imaging (TTE,POLLO,CMR,cath) were reviewed.       Tobacco use was discussed with the patient and educated on the negative effects.I have asked the patient to not utilize these agents.    All questions and concerns were addressed to the patient/family. Alternatives to my treatment were discussed. The note was completed using EMR. Every effort wasmade to ensure accuracy; however, inadvertent computerized transcription errors may be present.    Thank you for allowing me to participate in thecare or Gómez Peace    Dmitri Bettencourt MD, FACC, [facility]  Advanced Cardiac Imaging  [facility]

## 2025-05-06 NOTE — TELEPHONE ENCOUNTER
Address: [address] [place] [id number]  Hours: Open ? Closes 8:30?PM  Phone: [phone]      Pt called stating that he is in need of Brillinta sent to [facility]. Address above.

## 2025-05-08 NOTE — TELEPHONE ENCOUNTER
Patient would like a call back to discuss switching to another medication that's cheaper than Brilinta.    Can reach pt at [phone]

## 2025-05-09 RX ORDER — CLOPIDOGREL BISULFATE 75 MG/1
75 TABLET ORAL DAILY
Qty: 90 TABLET | Refills: 3 | Status: SHIPPED | OUTPATIENT
Start: 2025-05-09

## 2025-05-09 NOTE — TELEPHONE ENCOUNTER
Last procedure 10/2024.  Patient notified via voicemail-okay to switch to Plavix.  RX being sent to [facility] in [place].

## 2025-05-22 ENCOUNTER — APPOINTMENT (OUTPATIENT)
Dept: URBAN - METROPOLITAN AREA CLINIC 170 | Facility: CLINIC | Age: 70
Setting detail: DERMATOLOGY
End: 2025-05-22

## 2025-05-22 DIAGNOSIS — L82.1 OTHER SEBORRHEIC KERATOSIS: ICD-10-CM | Status: STABLE

## 2025-05-22 DIAGNOSIS — Z85.828 PERSONAL HISTORY OF OTHER MALIGNANT NEOPLASM OF SKIN: ICD-10-CM

## 2025-05-22 DIAGNOSIS — L57.0 ACTINIC KERATOSIS: ICD-10-CM | Status: STABLE

## 2025-05-22 DIAGNOSIS — L81.4 OTHER MELANIN HYPERPIGMENTATION: ICD-10-CM | Status: STABLE

## 2025-05-22 DIAGNOSIS — D22 MELANOCYTIC NEVI: ICD-10-CM | Status: STABLE

## 2025-05-22 DIAGNOSIS — D18.0 HEMANGIOMA: ICD-10-CM | Status: STABLE

## 2025-05-22 PROBLEM — D18.01 HEMANGIOMA OF SKIN AND SUBCUTANEOUS TISSUE: Status: ACTIVE | Noted: 2025-05-22

## 2025-05-22 PROBLEM — D22.5 MELANOCYTIC NEVI OF TRUNK: Status: ACTIVE | Noted: 2025-05-22

## 2025-05-22 PROCEDURE — 17003 DESTRUCT PREMALG LES 2-14: CPT

## 2025-05-22 PROCEDURE — ? COUNSELING

## 2025-05-22 PROCEDURE — ? TREATMENT REGIMEN

## 2025-05-22 PROCEDURE — ? MEDICARE ABN

## 2025-05-22 PROCEDURE — 99213 OFFICE O/P EST LOW 20 MIN: CPT | Mod: 25

## 2025-05-22 PROCEDURE — ? LIQUID NITROGEN

## 2025-05-22 PROCEDURE — ? FULL BODY SKIN EXAM

## 2025-05-22 PROCEDURE — 17000 DESTRUCT PREMALG LESION: CPT

## 2025-05-22 ASSESSMENT — LOCATION DETAILED DESCRIPTION DERM
LOCATION DETAILED: RIGHT POSTERIOR SHOULDER
LOCATION DETAILED: LEFT FOREHEAD
LOCATION DETAILED: RIGHT LATERAL ABDOMEN
LOCATION DETAILED: RIGHT DISTAL PRETIBIAL REGION
LOCATION DETAILED: RIGHT MID-UPPER BACK
LOCATION DETAILED: RIGHT DISTAL CALF
LOCATION DETAILED: RIGHT MEDIAL DISTAL PRETIBIAL REGION
LOCATION DETAILED: LEFT PROXIMAL PRETIBIAL REGION
LOCATION DETAILED: RIGHT PROXIMAL PRETIBIAL REGION
LOCATION DETAILED: LEFT LATERAL INFERIOR CHEST
LOCATION DETAILED: INFERIOR THORACIC SPINE
LOCATION DETAILED: EPIGASTRIC SKIN
LOCATION DETAILED: LEFT LATERAL ZYGOMA

## 2025-05-22 ASSESSMENT — LOCATION SIMPLE DESCRIPTION DERM
LOCATION SIMPLE: RIGHT CALF
LOCATION SIMPLE: RIGHT SHOULDER
LOCATION SIMPLE: RIGHT PRETIBIAL REGION
LOCATION SIMPLE: CHEST
LOCATION SIMPLE: ABDOMEN
LOCATION SIMPLE: UPPER BACK
LOCATION SIMPLE: LEFT FOREHEAD
LOCATION SIMPLE: RIGHT UPPER BACK
LOCATION SIMPLE: LEFT PRETIBIAL REGION
LOCATION SIMPLE: LEFT ZYGOMA

## 2025-05-22 ASSESSMENT — LOCATION ZONE DERM
LOCATION ZONE: FACE
LOCATION ZONE: LEG
LOCATION ZONE: TRUNK
LOCATION ZONE: ARM

## 2025-05-22 NOTE — PROCEDURE: LIQUID NITROGEN
Render Post-Care Instructions In Note?: yes
Consent: The patient's consent was obtained including but not limited to risks of crusting, scabbing, blistering, scarring, darker or lighter pigmentary change, recurrence, incomplete removal and infection.
Duration Of Freeze Thaw-Cycle (Seconds): 5
Number Of Freeze-Thaw Cycles: 1 freeze-thaw cycle
Detail Level: Detailed
Post-Care Instructions: I reviewed with the patient in detail post-care instructions. Patient is to wear sunprotection, and avoid picking at any of the treated lesions. Pt may apply Vaseline to crusted or scabbing areas.
Render Note In Bullet Format When Appropriate: No

## 2025-05-24 ENCOUNTER — OFFICE VISIT (OUTPATIENT)
Dept: URGENT CARE | Age: 70
End: 2025-05-24

## 2025-05-24 VITALS
WEIGHT: 201.6 LBS | HEIGHT: 70 IN | BODY MASS INDEX: 28.86 KG/M2 | TEMPERATURE: 97.7 F | SYSTOLIC BLOOD PRESSURE: 122 MMHG | OXYGEN SATURATION: 96 % | HEART RATE: 70 BPM | DIASTOLIC BLOOD PRESSURE: 70 MMHG

## 2025-05-24 DIAGNOSIS — R22.0 LEFT FACIAL SWELLING: ICD-10-CM

## 2025-05-24 DIAGNOSIS — R21 RASH: Primary | ICD-10-CM

## 2025-05-24 RX ORDER — ATORVASTATIN CALCIUM 10 MG/1
10 TABLET, FILM COATED ORAL DAILY
Qty: 5 TABLET | Refills: 0 | Status: SHIPPED | OUTPATIENT
Start: 2025-05-24 | End: 2025-05-29

## 2025-05-24 RX ORDER — ATORVASTATIN CALCIUM 10 MG/1
10 TABLET, FILM COATED ORAL DAILY
Qty: 30 TABLET | Refills: 3 | Status: SHIPPED | OUTPATIENT
Start: 2025-05-24 | End: 2025-05-24 | Stop reason: CLARIF

## 2025-05-24 RX ORDER — LORATADINE 10 MG/1
10 TABLET ORAL DAILY
Qty: 30 TABLET | Refills: 0 | Status: SHIPPED | OUTPATIENT
Start: 2025-05-24

## 2025-05-24 ASSESSMENT — ENCOUNTER SYMPTOMS
SORE THROAT: 0
SINUS PRESSURE: 0
DIARRHEA: 0
NAUSEA: 0
SINUS PAIN: 0
SHORTNESS OF BREATH: 0
TROUBLE SWALLOWING: 0
VOMITING: 0
COUGH: 0
FACIAL SWELLING: 1

## 2025-05-24 NOTE — PROGRESS NOTES
Gómez Peace (:  1955) is a 69 y.o. male,New patient, here for evaluation of the following chief complaint(s):  Facial Swelling (Patient complains of left sided facial swelling that he first noticed yesterday morning. Increased his rosuvastatin dose 3 days ago, unsure if that is related or if it is a dental infection. Went from 5MG to 20MG. Face is tender to touch. )      ASSESSMENT/PLAN:    ICD-10-CM    1. Rash  R21 amoxicillin-clavulanate (AUGMENTIN) 875-125 MG per tablet     loratadine (CLARITIN) 10 MG tablet     atorvastatin (LIPITOR) 10 MG tablet     DISCONTINUED: atorvastatin (LIPITOR) 10 MG tablet      2. Left facial swelling  R22.0         Patient has no slurred speech, confusion, vision change, headache, numbness. Is moving all parts of face.    Will cover patient for multiple possibilities of rash origin.     If this rash is from the medication dosage increase we want to get that medication out of your system. Patient has 2 stents so we want him to have a statin however. Will change to lipitor for a few doses-his pcp/cardio to let him know if they want him to continue this or go back to crestor or another (likely see how this rash course does as well)    -Rash as reaction to medication increase: Discontinue Crestor. A few pills of Lipitor prescribed at a low dose. Take these so you have statin coverage. Call your Cardiologist ASAP and they will advise you on if you are to continue these or go back to the Crestor.   Antihistamine Claritin sent to pharmacy as well. This will help with more allergy type, swelling reaction.     -Rash secondary to dental infection: Take antibiotic as prescribed.     Watch rash very closely.   Follow up with PCP/Dentist this week.  ER precautions: if any worsening such as spreading redness or shortness of breath, throat closure feeling proceed there immediately.     SUBJECTIVE/OBJECTIVE:    History provided by:  Patient   used: No      HPI:

## 2025-05-24 NOTE — PATIENT INSTRUCTIONS
-Rash as reaction to medication increase: Discontinue Crestor. A few pills of Lipitor prescribed at a low dose. Take these so you have statin coverage. Call your Cardiologist ASAP and they will advise you on if you are to continue these or go back to the Crestor.   Antihistamine Claritin sent to pharmacy as well. This will help with more allergy type reaction.     -Rash secondary to dental infection: Take antibiotic as prescribed.     Watch rash very closely.   Follow up with PCP this week.  ER precautions: if any worsening or shortness of breath, throat closure feeling proceed there immediately.

## 2025-05-27 ENCOUNTER — TELEPHONE (OUTPATIENT)
Dept: CARDIOLOGY CLINIC | Age: 70
End: 2025-05-27

## 2025-05-27 DIAGNOSIS — R21 RASH: ICD-10-CM

## 2025-05-27 RX ORDER — ATORVASTATIN CALCIUM 10 MG/1
10 TABLET, FILM COATED ORAL DAILY
Qty: 90 TABLET | Refills: 3 | Status: SHIPPED | OUTPATIENT
Start: 2025-05-27 | End: 2026-05-22

## 2025-05-27 NOTE — TELEPHONE ENCOUNTER
Spoke with pt, okay to switch to lipitor 10mg and follow up with PCP tomorrow. Recheck lipids in 2 months. Verbalized understanding.

## 2025-05-27 NOTE — TELEPHONE ENCOUNTER
The patient called stating after increasing his rosuvastatin the left side of his face swelled up to the point he had to go to the urgent care. The Urgent care provider advised him to stop taking rosuvastatin and he was given 5 tablets of Atorvastatin 10 mg and antibiotics. Please advise [phone]

## 2025-05-28 ENCOUNTER — OFFICE VISIT (OUTPATIENT)
Dept: PRIMARY CARE CLINIC | Age: 70
End: 2025-05-28

## 2025-05-28 VITALS
HEART RATE: 66 BPM | WEIGHT: 201.4 LBS | OXYGEN SATURATION: 98 % | BODY MASS INDEX: 28.9 KG/M2 | DIASTOLIC BLOOD PRESSURE: 64 MMHG | SYSTOLIC BLOOD PRESSURE: 122 MMHG

## 2025-05-28 DIAGNOSIS — R22.0 FACIAL SWELLING: Primary | ICD-10-CM

## 2025-05-28 NOTE — PROGRESS NOTES
Gómez Peace (:  1955) is a 69 y.o. male,Established patient, here for evaluation of the following chief complaint(s):  Facial Swelling (Left cheek went to  25)      Assessment & Plan  Facial swelling     Resolved  Not convinced this was due to previous statin but the change in statin is fine  Continue to monitor          Return if symptoms worsen or fail to improve.    Subjective       HPI    Statin was increased recently to 20 mg daily started last Tuesday and Friday night his face started swelling on left side, went to urgent care, and stopped statin and was given a different statin , and abx and allergy medicine and now doing better     Review of Systems   All other systems reviewed and are negative.              Objective     /64 (BP Site: Right Upper Arm, Patient Position: Sitting, BP Cuff Size: Medium Adult)   Pulse 66   Wt 91.4 kg (201 lb 6.4 oz)   SpO2 98%   BMI 28.90 kg/m²      Physical Exam  Vitals reviewed.   Constitutional:       General: He is not in acute distress.     Appearance: Normal appearance. He is not ill-appearing, toxic-appearing or diaphoretic.   HENT:      Head: Normocephalic and atraumatic.      Right Ear: External ear normal.      Left Ear: External ear normal.      Nose: Nose normal.      Mouth/Throat:      Mouth: Mucous membranes are moist.      Pharynx: No oropharyngeal exudate or posterior oropharyngeal erythema.      Comments: No swelling   No tenderness  Eyes:      Extraocular Movements: Extraocular movements intact.   Cardiovascular:      Rate and Rhythm: Normal rate and regular rhythm.      Heart sounds: Normal heart sounds. No murmur heard.     No friction rub. No gallop.   Pulmonary:      Effort: Pulmonary effort is normal.      Breath sounds: Normal breath sounds. No wheezing, rhonchi or rales.   Musculoskeletal:      Cervical back: Normal range of motion.   Skin:     General: Skin is warm.   Neurological:      General: No focal deficit

## 2025-07-17 DIAGNOSIS — E78.2 MIXED HYPERLIPIDEMIA: ICD-10-CM

## 2025-07-18 LAB
CHOLEST SERPL-MCNC: 130 MG/DL (ref 0–199)
HDLC SERPL-MCNC: 50 MG/DL (ref 40–60)
LDLC SERPL CALC-MCNC: 65 MG/DL
TRIGL SERPL-MCNC: 74 MG/DL (ref 0–150)
VLDLC SERPL CALC-MCNC: 15 MG/DL

## 2025-08-04 RX ORDER — VALSARTAN AND HYDROCHLOROTHIAZIDE 160; 12.5 MG/1; MG/1
1 TABLET, FILM COATED ORAL DAILY
Qty: 90 TABLET | Refills: 1 | Status: SHIPPED | OUTPATIENT
Start: 2025-08-04

## (undated) DEVICE — GLIDESHEATH SLENDER STAINLESS STEEL KIT: Brand: GLIDESHEATH SLENDER

## (undated) DEVICE — GLOVE ORANGE PI 7   MSG9070

## (undated) DEVICE — CATHETER GUID 6FR L100CM DIA0.071IN NYL SHFT EBU3.5

## (undated) DEVICE — NEEDLE HYPO 18GA L1.5IN PNK POLYPR HUB S STL REG BVL STR

## (undated) DEVICE — SUTURE MCRYL SZ 3-0 L27IN ABSRB UD PS-2 3/8 CIR REV CUT NDL MCP427H

## (undated) DEVICE — CORONARY IMAGING CATHETER: Brand: OPTICROSS™ 6 HD

## (undated) DEVICE — ELECTRODE PT RET AD L9FT HI MOIST COND ADH HYDRGEL CORDED

## (undated) DEVICE — CATHETER DIAG 5FR L100CM LUMN ID0.047IN JR4 CRV 0 SIDE H

## (undated) DEVICE — SOLUTION IV IRRIG 500ML 0.9% SODIUM CHL 2F7123

## (undated) DEVICE — TR BAND RADIAL ARTERY COMPRESSION DEVICE: Brand: TR BAND

## (undated) DEVICE — PRESSURE GUIDEWIRE: Brand: COMET™ II

## (undated) DEVICE — PAD, DEFIB, ADULT, RADIOTRANS, PHYSIO: Brand: MEDLINE

## (undated) DEVICE — PENCIL ES CRD L10FT HND SWCHING ROCK SWCH W/ EDGE COAT BLDE

## (undated) DEVICE — LIQUIBAND RAPID ADHESIVE 36/CS 0.8ML: Brand: MEDLINE

## (undated) DEVICE — TUBING SUCT 10FR MAL ALUM SHFT FN CAP VENT UNIV CONN W/ OBT

## (undated) DEVICE — Device

## (undated) DEVICE — CATH LAB PACK: Brand: MEDLINE INDUSTRIES, INC.

## (undated) DEVICE — SURGICAL PROCEDURE PACK IV U-BAR

## (undated) DEVICE — RUNTHROUGH NS EXTRA FLOPPY PTCA GUIDEWIRE: Brand: RUNTHROUGH

## (undated) DEVICE — GUIDEWIRE VASC L260CM DIA0.035IN RAD 3MM J TIP L7CM PTFE

## (undated) DEVICE — CATH BLLN ANGIO 3.75X12MM NC EUPHORIA RX

## (undated) DEVICE — ELECTRODE ELECSURG NDL 2.8 INX7.2 CM COAT INSUL EDGE

## (undated) DEVICE — CATHETER DIAG 5FR L100CM LUMN ID0.047IN JL3.5 CRV 0 SIDE H

## (undated) DEVICE — APPLICATOR MEDICATED 26 CC SOLUTION HI LT ORNG CHLORAPREP

## (undated) DEVICE — NEEDLE HYPO 30GA L0.5IN BGE POLYPR HUB S STL REG BVL STR

## (undated) DEVICE — MINOR SET UP PACK: Brand: MEDLINE INDUSTRIES, INC.

## (undated) DEVICE — CATH BLLN ANGIO 2.75X15MM SC EUPHORA RX

## (undated) DEVICE — INTENDED USE FOR SURGICAL MARKING ON INTACT SKIN, ALSO PROVIDES A PERMANENT METHOD OF IDENTIFYING OBJECTS IN THE OPERATING ROOM: Brand: WRITESITE® PLUS MINI PREP RESISTANT MARKER

## (undated) DEVICE — GLOVE SURG SZ 75 L12IN FNGR THK94MIL STD WHT LTX FREE